# Patient Record
Sex: FEMALE | Race: ASIAN | NOT HISPANIC OR LATINO | URBAN - METROPOLITAN AREA
[De-identification: names, ages, dates, MRNs, and addresses within clinical notes are randomized per-mention and may not be internally consistent; named-entity substitution may affect disease eponyms.]

---

## 2018-01-22 ENCOUNTER — EMERGENCY (EMERGENCY)
Facility: HOSPITAL | Age: 53
LOS: 1 days | Discharge: HOME | End: 2018-01-22
Attending: EMERGENCY MEDICINE | Admitting: EMERGENCY MEDICINE
Payer: MEDICAID

## 2018-01-22 VITALS
SYSTOLIC BLOOD PRESSURE: 153 MMHG | TEMPERATURE: 99 F | RESPIRATION RATE: 20 BRPM | OXYGEN SATURATION: 100 % | HEART RATE: 85 BPM | DIASTOLIC BLOOD PRESSURE: 102 MMHG

## 2018-01-22 PROCEDURE — 99285 EMERGENCY DEPT VISIT HI MDM: CPT

## 2018-01-22 NOTE — ED ADULT TRIAGE NOTE - CHIEF COMPLAINT QUOTE
chest pain and back pain radiating to left arm and  tingling to left arm since Friday. also c/o MIGUEL.  pt has cardiac history.  Also c /o fever, cough, diarrhea  x5 today. vomited 4 times Yesterday. PMH of DM, HTN, high cholesterol, Hypothyroidism, asthma.

## 2018-01-23 VITALS
DIASTOLIC BLOOD PRESSURE: 86 MMHG | TEMPERATURE: 98 F | OXYGEN SATURATION: 99 % | HEART RATE: 89 BPM | SYSTOLIC BLOOD PRESSURE: 172 MMHG | RESPIRATION RATE: 18 BRPM

## 2018-01-23 DIAGNOSIS — Z90.49 ACQUIRED ABSENCE OF OTHER SPECIFIED PARTS OF DIGESTIVE TRACT: Chronic | ICD-10-CM

## 2018-01-23 LAB
ALBUMIN SERPL ELPH-MCNC: 4.2 G/DL — SIGNIFICANT CHANGE UP (ref 3.3–5)
ALP SERPL-CCNC: 70 U/L — SIGNIFICANT CHANGE UP (ref 40–120)
ALT FLD-CCNC: 29 U/L — SIGNIFICANT CHANGE UP (ref 4–33)
APPEARANCE UR: CLEAR — SIGNIFICANT CHANGE UP
AST SERPL-CCNC: 26 U/L — SIGNIFICANT CHANGE UP (ref 4–32)
BASOPHILS # BLD AUTO: 0.03 K/UL — SIGNIFICANT CHANGE UP (ref 0–0.2)
BASOPHILS NFR BLD AUTO: 0.6 % — SIGNIFICANT CHANGE UP (ref 0–2)
BILIRUB SERPL-MCNC: 0.2 MG/DL — SIGNIFICANT CHANGE UP (ref 0.2–1.2)
BILIRUB UR-MCNC: NEGATIVE — SIGNIFICANT CHANGE UP
BLOOD UR QL VISUAL: HIGH
BUN SERPL-MCNC: 15 MG/DL — SIGNIFICANT CHANGE UP (ref 7–23)
CALCIUM SERPL-MCNC: 8.8 MG/DL — SIGNIFICANT CHANGE UP (ref 8.4–10.5)
CHLORIDE SERPL-SCNC: 108 MMOL/L — HIGH (ref 98–107)
CK MB BLD-MCNC: 1 NG/ML — SIGNIFICANT CHANGE UP (ref 1–4.7)
CK MB BLD-MCNC: 1.04 NG/ML — SIGNIFICANT CHANGE UP (ref 1–4.7)
CK SERPL-CCNC: 141 U/L — SIGNIFICANT CHANGE UP (ref 25–170)
CK SERPL-CCNC: 164 U/L — SIGNIFICANT CHANGE UP (ref 25–170)
CO2 SERPL-SCNC: 24 MMOL/L — SIGNIFICANT CHANGE UP (ref 22–31)
COLOR SPEC: YELLOW — SIGNIFICANT CHANGE UP
CREAT SERPL-MCNC: 0.99 MG/DL — SIGNIFICANT CHANGE UP (ref 0.5–1.3)
EOSINOPHIL # BLD AUTO: 0.4 K/UL — SIGNIFICANT CHANGE UP (ref 0–0.5)
EOSINOPHIL NFR BLD AUTO: 8.3 % — HIGH (ref 0–6)
GLUCOSE SERPL-MCNC: 148 MG/DL — HIGH (ref 70–99)
GLUCOSE UR-MCNC: NEGATIVE — SIGNIFICANT CHANGE UP
HCT VFR BLD CALC: 35.1 % — SIGNIFICANT CHANGE UP (ref 34.5–45)
HGB BLD-MCNC: 11.1 G/DL — LOW (ref 11.5–15.5)
IMM GRANULOCYTES # BLD AUTO: 0.01 # — SIGNIFICANT CHANGE UP
IMM GRANULOCYTES NFR BLD AUTO: 0.2 % — SIGNIFICANT CHANGE UP (ref 0–1.5)
KETONES UR-MCNC: NEGATIVE — SIGNIFICANT CHANGE UP
LEUKOCYTE ESTERASE UR-ACNC: HIGH
LYMPHOCYTES # BLD AUTO: 1.47 K/UL — SIGNIFICANT CHANGE UP (ref 1–3.3)
LYMPHOCYTES # BLD AUTO: 30.4 % — SIGNIFICANT CHANGE UP (ref 13–44)
MCHC RBC-ENTMCNC: 27.8 PG — SIGNIFICANT CHANGE UP (ref 27–34)
MCHC RBC-ENTMCNC: 31.6 % — LOW (ref 32–36)
MCV RBC AUTO: 88 FL — SIGNIFICANT CHANGE UP (ref 80–100)
MONOCYTES # BLD AUTO: 0.68 K/UL — SIGNIFICANT CHANGE UP (ref 0–0.9)
MONOCYTES NFR BLD AUTO: 14 % — SIGNIFICANT CHANGE UP (ref 2–14)
MUCOUS THREADS # UR AUTO: SIGNIFICANT CHANGE UP
NEUTROPHILS # BLD AUTO: 2.25 K/UL — SIGNIFICANT CHANGE UP (ref 1.8–7.4)
NEUTROPHILS NFR BLD AUTO: 46.5 % — SIGNIFICANT CHANGE UP (ref 43–77)
NITRITE UR-MCNC: NEGATIVE — SIGNIFICANT CHANGE UP
NRBC # FLD: 0 — SIGNIFICANT CHANGE UP
PH UR: 5.5 — SIGNIFICANT CHANGE UP (ref 4.6–8)
PLATELET # BLD AUTO: 228 K/UL — SIGNIFICANT CHANGE UP (ref 150–400)
PMV BLD: 11.4 FL — SIGNIFICANT CHANGE UP (ref 7–13)
POTASSIUM SERPL-MCNC: 4.8 MMOL/L — SIGNIFICANT CHANGE UP (ref 3.5–5.3)
POTASSIUM SERPL-SCNC: 4.8 MMOL/L — SIGNIFICANT CHANGE UP (ref 3.5–5.3)
PROT SERPL-MCNC: 7.6 G/DL — SIGNIFICANT CHANGE UP (ref 6–8.3)
PROT UR-MCNC: 30 MG/DL — HIGH
RBC # BLD: 3.99 M/UL — SIGNIFICANT CHANGE UP (ref 3.8–5.2)
RBC # FLD: 14.1 % — SIGNIFICANT CHANGE UP (ref 10.3–14.5)
RBC CASTS # UR COMP ASSIST: HIGH (ref 0–?)
SODIUM SERPL-SCNC: 144 MMOL/L — SIGNIFICANT CHANGE UP (ref 135–145)
SP GR SPEC: 1.02 — SIGNIFICANT CHANGE UP (ref 1–1.04)
SQUAMOUS # UR AUTO: SIGNIFICANT CHANGE UP
TROPONIN T SERPL-MCNC: < 0.06 NG/ML — SIGNIFICANT CHANGE UP (ref 0–0.06)
TROPONIN T SERPL-MCNC: < 0.06 NG/ML — SIGNIFICANT CHANGE UP (ref 0–0.06)
UROBILINOGEN FLD QL: NORMAL MG/DL — SIGNIFICANT CHANGE UP
WBC # BLD: 4.84 K/UL — SIGNIFICANT CHANGE UP (ref 3.8–10.5)
WBC # FLD AUTO: 4.84 K/UL — SIGNIFICANT CHANGE UP (ref 3.8–10.5)
WBC UR QL: SIGNIFICANT CHANGE UP (ref 0–?)

## 2018-01-23 PROCEDURE — 71046 X-RAY EXAM CHEST 2 VIEWS: CPT | Mod: 26

## 2018-01-23 RX ORDER — CEPHALEXIN 500 MG
1 CAPSULE ORAL
Qty: 14 | Refills: 0
Start: 2018-01-23 | End: 2018-01-29

## 2018-01-23 RX ORDER — IBUPROFEN 200 MG
400 TABLET ORAL ONCE
Qty: 0 | Refills: 0 | Status: COMPLETED | OUTPATIENT
Start: 2018-01-23 | End: 2018-01-23

## 2018-01-23 RX ORDER — CEPHALEXIN 500 MG
500 CAPSULE ORAL ONCE
Qty: 0 | Refills: 0 | Status: COMPLETED | OUTPATIENT
Start: 2018-01-23 | End: 2018-01-23

## 2018-01-23 RX ORDER — SODIUM CHLORIDE 9 MG/ML
1000 INJECTION INTRAMUSCULAR; INTRAVENOUS; SUBCUTANEOUS ONCE
Qty: 0 | Refills: 0 | Status: COMPLETED | OUTPATIENT
Start: 2018-01-23 | End: 2018-01-23

## 2018-01-23 RX ORDER — ACETAMINOPHEN 500 MG
1000 TABLET ORAL ONCE
Qty: 0 | Refills: 0 | Status: COMPLETED | OUTPATIENT
Start: 2018-01-23 | End: 2018-01-23

## 2018-01-23 RX ORDER — FAMOTIDINE 10 MG/ML
20 INJECTION INTRAVENOUS ONCE
Qty: 0 | Refills: 0 | Status: COMPLETED | OUTPATIENT
Start: 2018-01-23 | End: 2018-01-23

## 2018-01-23 RX ADMIN — Medication 1000 MILLIGRAM(S): at 05:45

## 2018-01-23 RX ADMIN — FAMOTIDINE 20 MILLIGRAM(S): 10 INJECTION INTRAVENOUS at 01:24

## 2018-01-23 RX ADMIN — Medication 400 MILLIGRAM(S): at 01:24

## 2018-01-23 RX ADMIN — SODIUM CHLORIDE 1333.33 MILLILITER(S): 9 INJECTION INTRAMUSCULAR; INTRAVENOUS; SUBCUTANEOUS at 01:24

## 2018-01-23 RX ADMIN — Medication 30 MILLILITER(S): at 01:24

## 2018-01-23 NOTE — ED PROVIDER NOTE - MEDICAL DECISION MAKING DETAILS
Pt hx asthma, DM, HTN, thyroid dz now with flu like sx and chest pain for 3.5 days. Clinically appears dehydrated and mildly congested. Will evaluate with labs, imaging, f/u studies, reassess, dispo.

## 2018-01-23 NOTE — ED PROVIDER NOTE - CARE PLAN
Principal Discharge DX:	Viral syndrome  Secondary Diagnosis:	Chest pain Principal Discharge DX:	Urinary tract infection  Assessment and plan of treatment:	Take the keflex medication as prescribed twice daily for the next 7 days. Follow up with your regular doctor as needed for further evaluation. Return here to the emergency department for any new symptoms of concern such as severe shortness of breath, chest pain with difficulties breathing, or other new symptoms of acute worry.  Secondary Diagnosis:	Chest pain  Secondary Diagnosis:	Viral syndrome

## 2018-01-23 NOTE — ED PROVIDER NOTE - OBJECTIVE STATEMENT
Hx asthma, DM, HTN, thyroid dz, now with cough for the past week as well as chest pain for the past 3.5 days, describes it as sharp, starts in the middle of her back and radiates to the left arm and back. Today she developed nausea and had an episode of post-tussive emesis, she has also felt dehydrated and states she has not been drinking and eating as much as usual. She has had Hx asthma, DM, HTN, thyroid dz, now with cough for the past week as well as chest pain for the past 3.5 days, describes it as sharp, starts in the middle of her back and radiates to the left arm and back. Today she developed nausea and had an episode of post-tussive emesis, she has also felt dehydrated and states she has not been drinking and eating as much as usual. She has had heartburn in the past and states this feels similar but is abnormal in that she also feels fatigued. Hx asthma, DM, HTN, thyroid dz, now with cough for the past week as well as chest pain for the past 3.5 days, describes it as sharp, starts in the middle of her back and radiates to the left arm and back. Today she developed nausea and had an episode of post-tussive emesis, she has also felt dehydrated and states she has not been drinking and eating as much as usual. She has had heartburn in the past and states this feels similar but is abnormal in that she also feels fatigued. She denies having pain with urination, but states that it is occasionally stinging when initiating her urine. She denies other symptoms of concern at this time.

## 2018-01-23 NOTE — ED PROVIDER NOTE - PHYSICAL EXAMINATION
Gen: NAD, non-toxic, conversational  Eyes: PERRLA, EOMI   HENT: Normocephalic, atraumatic. External ears normal, no rhinorrhea, moist mucous membranes.   CV: RRR, no M/R/G  Resp: CTAB, non-labored, speaking without difficulty on room air  Abd: soft, non tender, non rigid, no guarding or rebound tenderness  Skin: dry, wwp   Neuro: AOx3, speech is fluent and appropriate  Psych: Mood good, affect euthymic Gen: NAD, non-toxic, conversational  Eyes: PERRL, EOMI   HENT: Normocephalic, atraumatic. External ears normal, no rhinorrhea, moist mucous membranes.   CV: RRR, no M/R/G  Resp: CTAB, non-labored, speaking without difficulty on room air  Abd: soft, non tender, non rigid, no guarding or rebound tenderness  Back: No midline tenderess, step off, or crepitation. No CVA tenderness.   Skin: dry, wwp   Neuro: AOx3, speech is fluent and appropriate  Psych: Mood good, affect euthymic

## 2018-01-23 NOTE — ED PROVIDER NOTE - PLAN OF CARE
Take the keflex medication as prescribed twice daily for the next 7 days. Follow up with your regular doctor as needed for further evaluation. Return here to the emergency department for any new symptoms of concern such as severe shortness of breath, chest pain with difficulties breathing, or other new symptoms of acute worry.

## 2018-01-23 NOTE — ED PROVIDER NOTE - ATTENDING CONTRIBUTION TO CARE
DR. DILL, ATTENDING MD-  I performed a face to face bedside interview with patient regarding history of present illness, review of symptoms and past medical history. I completed an independent physical exam.  I have discussed patient's plan of care with the resident.   Documentation as above in the note.    53 y/o female h/o dm, htn, high chol, hypothyroid, asthma here with c/o mmc.  CP left sided rad to lue x4 days, gradual onset, intermittent, feels similar to cp few years ago when she had neg angiogram.  Yesterday developed n/v x1 nbnb.  Today had subj fever, dry cough, loose stools.  No sick contacts or recent travel.  Has had flu vacc.  Denies ha, neck stiffness, sob, abd pain, dysuria, rash.  Afebrile, nad, ctabil, s1s2 rrr no m/r/g, cw mild ttp over sternum, no crepitus, abd soft non ttp no r/g, no cva tenderness b/l, no leg swelling b/l, no rash.  ACS vs viral syndrome vs pna vs anemia vs lyte abn.  Obtain cbc, bmp, cxr, ce's x2, ekg x2, give ivf, reassess.

## 2018-01-23 NOTE — ED PROVIDER NOTE - NS ED ROS FT
General: + fevers and chills  HENT: No ear pain, runny nose, + sore throat  Eyes: No visual changes  CP: + chest pain, no palpitations, no light headedness  Resp: No shortness of breath, + cough  GI: + epigastric abdominal pain, no diarrhea, no constipation, + nausea, + vomiting  : No urinary fz, dysuria, or hematuria  Neuro: No numbness, tingling, or weakness  Endo: +hx of diabetes  Heme: No hx of easy bleeding or bruising, +asa 81mg

## 2018-01-23 NOTE — ED ADULT NURSE NOTE - OBJECTIVE STATEMENT
Patient rec'd for midsternal CP x  5 days radiating to left arm worsening tonight. Patient endorses +SOB. C/o of pain in left arm with blood pressure cuff. Patient states she took her home dose of aspirin today. Placed on cardiac monitor NSR.

## 2023-06-13 PROBLEM — E11.9 TYPE 2 DIABETES MELLITUS WITHOUT COMPLICATIONS: Chronic | Status: ACTIVE | Noted: 2018-01-23

## 2023-06-13 PROBLEM — J45.909 UNSPECIFIED ASTHMA, UNCOMPLICATED: Chronic | Status: ACTIVE | Noted: 2018-01-23

## 2023-06-13 PROBLEM — I10 ESSENTIAL (PRIMARY) HYPERTENSION: Chronic | Status: ACTIVE | Noted: 2018-01-23

## 2023-06-13 PROBLEM — E07.9 DISORDER OF THYROID, UNSPECIFIED: Chronic | Status: ACTIVE | Noted: 2018-01-23

## 2023-06-16 ENCOUNTER — OUTPATIENT (OUTPATIENT)
Dept: OUTPATIENT SERVICES | Facility: HOSPITAL | Age: 58
LOS: 1 days | End: 2023-06-16
Payer: MEDICAID

## 2023-06-16 VITALS
WEIGHT: 153 LBS | OXYGEN SATURATION: 99 % | TEMPERATURE: 98 F | RESPIRATION RATE: 16 BRPM | HEART RATE: 79 BPM | DIASTOLIC BLOOD PRESSURE: 84 MMHG | HEIGHT: 61 IN | SYSTOLIC BLOOD PRESSURE: 140 MMHG

## 2023-06-16 DIAGNOSIS — E11.9 TYPE 2 DIABETES MELLITUS WITHOUT COMPLICATIONS: ICD-10-CM

## 2023-06-16 DIAGNOSIS — N60.11 DIFFUSE CYSTIC MASTOPATHY OF RIGHT BREAST: ICD-10-CM

## 2023-06-16 DIAGNOSIS — Z90.710 ACQUIRED ABSENCE OF BOTH CERVIX AND UTERUS: Chronic | ICD-10-CM

## 2023-06-16 DIAGNOSIS — N63.0 UNSPECIFIED LUMP IN UNSPECIFIED BREAST: ICD-10-CM

## 2023-06-16 DIAGNOSIS — Z98.890 OTHER SPECIFIED POSTPROCEDURAL STATES: Chronic | ICD-10-CM

## 2023-06-16 DIAGNOSIS — Z90.49 ACQUIRED ABSENCE OF OTHER SPECIFIED PARTS OF DIGESTIVE TRACT: Chronic | ICD-10-CM

## 2023-06-16 LAB
A1C WITH ESTIMATED AVERAGE GLUCOSE RESULT: 6.7 % — HIGH (ref 4–5.6)
ANION GAP SERPL CALC-SCNC: 16 MMOL/L — HIGH (ref 7–14)
BUN SERPL-MCNC: 24 MG/DL — HIGH (ref 7–23)
CALCIUM SERPL-MCNC: 9.7 MG/DL — SIGNIFICANT CHANGE UP (ref 8.4–10.5)
CHLORIDE SERPL-SCNC: 105 MMOL/L — SIGNIFICANT CHANGE UP (ref 98–107)
CO2 SERPL-SCNC: 20 MMOL/L — LOW (ref 22–31)
CREAT SERPL-MCNC: 1.22 MG/DL — SIGNIFICANT CHANGE UP (ref 0.5–1.3)
EGFR: 51 ML/MIN/1.73M2 — LOW
ESTIMATED AVERAGE GLUCOSE: 146 — SIGNIFICANT CHANGE UP
GLUCOSE SERPL-MCNC: 90 MG/DL — SIGNIFICANT CHANGE UP (ref 70–99)
HCT VFR BLD CALC: 37.8 % — SIGNIFICANT CHANGE UP (ref 34.5–45)
HGB BLD-MCNC: 11.2 G/DL — LOW (ref 11.5–15.5)
MCHC RBC-ENTMCNC: 25.6 PG — LOW (ref 27–34)
MCHC RBC-ENTMCNC: 29.6 GM/DL — LOW (ref 32–36)
MCV RBC AUTO: 86.3 FL — SIGNIFICANT CHANGE UP (ref 80–100)
NRBC # BLD: 0 /100 WBCS — SIGNIFICANT CHANGE UP (ref 0–0)
NRBC # FLD: 0 K/UL — SIGNIFICANT CHANGE UP (ref 0–0)
PLATELET # BLD AUTO: 419 K/UL — HIGH (ref 150–400)
POTASSIUM SERPL-MCNC: 4 MMOL/L — SIGNIFICANT CHANGE UP (ref 3.5–5.3)
POTASSIUM SERPL-SCNC: 4 MMOL/L — SIGNIFICANT CHANGE UP (ref 3.5–5.3)
RBC # BLD: 4.38 M/UL — SIGNIFICANT CHANGE UP (ref 3.8–5.2)
RBC # FLD: 15.7 % — HIGH (ref 10.3–14.5)
SODIUM SERPL-SCNC: 141 MMOL/L — SIGNIFICANT CHANGE UP (ref 135–145)
WBC # BLD: 9.85 K/UL — SIGNIFICANT CHANGE UP (ref 3.8–10.5)
WBC # FLD AUTO: 9.85 K/UL — SIGNIFICANT CHANGE UP (ref 3.8–10.5)

## 2023-06-16 PROCEDURE — 93010 ELECTROCARDIOGRAM REPORT: CPT

## 2023-06-16 NOTE — H&P PST ADULT - HISTORY OF PRESENT ILLNESS
57 yrs old female with h/o mammo and a ? mass was noted in her right breast. Pt then had further diagnostic studies including a biopsy that showed malignancy as per the pt. Pt presents for preop eval to have lumpectomy right breast with bria  localization, sentinel node injection, biopsy with frozen section possible axillary lymph node dissection on 6/22/23. 57 yrs old female with h/o mammo recently and a ? mass was noted in her right breast. Pt then had further diagnostic studies including a biopsy that showed malignancy as per the pt. Pt presents for preop eval to have lumpectomy right breast with bria  localization, sentinel node injection, biopsy with frozen section possible axillary lymph node dissection on 6/22/23.

## 2023-06-16 NOTE — H&P PST ADULT - ASSESSMENT
57 yrs old female with ? mass in her right breast presents for preop eval to have lumpectomy right breast with bria  localization, sentinel node injection, biopsy with frozen section possible axillary lymph node dissection on 6/22/23.

## 2023-06-16 NOTE — H&P PST ADULT - NSANTHOSAYNRD_GEN_A_CORE
never tested/No. ASHLEY screening performed.  STOP BANG Legend: 0-2 = LOW Risk; 3-4 = INTERMEDIATE Risk; 5-8 = HIGH Risk

## 2023-06-16 NOTE — H&P PST ADULT - NSICDXPASTSURGICALHX_GEN_ALL_CORE_FT
PAST SURGICAL HISTORY:  H/O ovarian cystectomy     H/O: hysterectomy     History of cholecystectomy

## 2023-06-16 NOTE — H&P PST ADULT - NSICDXPASTMEDICALHX_GEN_ALL_CORE_FT
PAST MEDICAL HISTORY:  Asthma     Breast mass     DM (diabetes mellitus)     Gall stones     HTN (hypertension)     Thyroid disease

## 2023-06-16 NOTE — H&P PST ADULT - PROBLEM SELECTOR PLAN 2
a1c pending, will order FS for the DOS.   Pt also advised not to take any diabetic meds on the DOS.   Pt also advised to stop Farxiga on 6/18/23.

## 2023-06-16 NOTE — H&P PST ADULT - PROBLEM SELECTOR PLAN 1
57 yrs old female with ? mass in her right breast presents for preop eval to have lumpectomy right breast with bria  localization, sentinel node injection, biopsy with frozen section possible axillary lymph node dissection on 6/22/23.  labs pending, ekg in chart.  Preop instructions provided to pt.  Famotidine and chlorhexidine scrubs provided to pt with instructions.  Pt advised to stop baby aspirin on 6/16/23.  Pt going for medical clearance - will obtain copy

## 2023-06-19 PROBLEM — K80.20 CALCULUS OF GALLBLADDER WITHOUT CHOLECYSTITIS WITHOUT OBSTRUCTION: Chronic | Status: ACTIVE | Noted: 2023-06-16

## 2023-06-19 PROBLEM — N63.0 UNSPECIFIED LUMP IN UNSPECIFIED BREAST: Chronic | Status: ACTIVE | Noted: 2023-06-16

## 2023-06-20 ENCOUNTER — OUTPATIENT (OUTPATIENT)
Dept: OUTPATIENT SERVICES | Facility: HOSPITAL | Age: 58
LOS: 1 days | End: 2023-06-20
Payer: MEDICAID

## 2023-06-20 ENCOUNTER — APPOINTMENT (OUTPATIENT)
Dept: MAMMOGRAPHY | Facility: IMAGING CENTER | Age: 58
End: 2023-06-20
Payer: MEDICAID

## 2023-06-20 DIAGNOSIS — Z90.49 ACQUIRED ABSENCE OF OTHER SPECIFIED PARTS OF DIGESTIVE TRACT: Chronic | ICD-10-CM

## 2023-06-20 DIAGNOSIS — Z98.890 OTHER SPECIFIED POSTPROCEDURAL STATES: Chronic | ICD-10-CM

## 2023-06-20 DIAGNOSIS — Z90.710 ACQUIRED ABSENCE OF BOTH CERVIX AND UTERUS: Chronic | ICD-10-CM

## 2023-06-20 DIAGNOSIS — Z00.8 ENCOUNTER FOR OTHER GENERAL EXAMINATION: ICD-10-CM

## 2023-06-20 PROCEDURE — C1739: CPT

## 2023-06-20 PROCEDURE — 19281 PERQ DEVICE BREAST 1ST IMAG: CPT | Mod: RT

## 2023-06-20 PROCEDURE — 19282 PERQ DEVICE BREAST EA IMAG: CPT | Mod: RT

## 2023-06-20 PROCEDURE — 19282 PERQ DEVICE BREAST EA IMAG: CPT

## 2023-06-20 PROCEDURE — 19281 PERQ DEVICE BREAST 1ST IMAG: CPT

## 2023-06-21 ENCOUNTER — TRANSCRIPTION ENCOUNTER (OUTPATIENT)
Age: 58
End: 2023-06-21

## 2023-06-21 PROBLEM — Z00.00 ENCOUNTER FOR PREVENTIVE HEALTH EXAMINATION: Status: ACTIVE | Noted: 2023-06-21

## 2023-06-22 ENCOUNTER — TRANSCRIPTION ENCOUNTER (OUTPATIENT)
Age: 58
End: 2023-06-22

## 2023-06-22 ENCOUNTER — OUTPATIENT (OUTPATIENT)
Dept: OUTPATIENT SERVICES | Facility: HOSPITAL | Age: 58
LOS: 1 days | End: 2023-06-22
Payer: COMMERCIAL

## 2023-06-22 ENCOUNTER — OUTPATIENT (OUTPATIENT)
Dept: OUTPATIENT SERVICES | Facility: HOSPITAL | Age: 58
LOS: 1 days | Discharge: HOME | End: 2023-06-22
Payer: MEDICAID

## 2023-06-22 ENCOUNTER — APPOINTMENT (OUTPATIENT)
Dept: NUCLEAR MEDICINE | Facility: IMAGING CENTER | Age: 58
End: 2023-06-22

## 2023-06-22 ENCOUNTER — APPOINTMENT (OUTPATIENT)
Dept: MAMMOGRAPHY | Facility: IMAGING CENTER | Age: 58
End: 2023-06-22
Payer: COMMERCIAL

## 2023-06-22 VITALS
OXYGEN SATURATION: 97 % | SYSTOLIC BLOOD PRESSURE: 137 MMHG | DIASTOLIC BLOOD PRESSURE: 62 MMHG | HEART RATE: 80 BPM | TEMPERATURE: 97 F | RESPIRATION RATE: 14 BRPM

## 2023-06-22 VITALS
HEART RATE: 73 BPM | HEIGHT: 61 IN | TEMPERATURE: 98 F | OXYGEN SATURATION: 100 % | DIASTOLIC BLOOD PRESSURE: 83 MMHG | SYSTOLIC BLOOD PRESSURE: 163 MMHG | RESPIRATION RATE: 16 BRPM | WEIGHT: 153 LBS

## 2023-06-22 DIAGNOSIS — N60.11 DIFFUSE CYSTIC MASTOPATHY OF RIGHT BREAST: ICD-10-CM

## 2023-06-22 DIAGNOSIS — Z00.8 ENCOUNTER FOR OTHER GENERAL EXAMINATION: ICD-10-CM

## 2023-06-22 DIAGNOSIS — Z90.49 ACQUIRED ABSENCE OF OTHER SPECIFIED PARTS OF DIGESTIVE TRACT: Chronic | ICD-10-CM

## 2023-06-22 DIAGNOSIS — Z98.890 OTHER SPECIFIED POSTPROCEDURAL STATES: Chronic | ICD-10-CM

## 2023-06-22 DIAGNOSIS — Z90.710 ACQUIRED ABSENCE OF BOTH CERVIX AND UTERUS: Chronic | ICD-10-CM

## 2023-06-22 LAB — GLUCOSE BLDC GLUCOMTR-MCNC: 119 MG/DL — HIGH (ref 70–99)

## 2023-06-22 PROCEDURE — 76098 X-RAY EXAM SURGICAL SPECIMEN: CPT

## 2023-06-22 PROCEDURE — 88307 TISSUE EXAM BY PATHOLOGIST: CPT | Mod: 26

## 2023-06-22 PROCEDURE — 76098 X-RAY EXAM SURGICAL SPECIMEN: CPT | Mod: 26

## 2023-06-22 PROCEDURE — 88331 PATH CONSLTJ SURG 1 BLK 1SPC: CPT | Mod: 26

## 2023-06-22 DEVICE — CLIP APPLIER COVIDIEN SURGICLIP 13" LARGE: Type: IMPLANTABLE DEVICE | Status: FUNCTIONAL

## 2023-06-22 RX ORDER — OXYCODONE HYDROCHLORIDE 5 MG/1
1 TABLET ORAL
Qty: 10 | Refills: 0
Start: 2023-06-22 | End: 2023-06-24

## 2023-06-22 RX ORDER — AMLODIPINE BESYLATE 2.5 MG/1
1 TABLET ORAL
Refills: 0 | DISCHARGE

## 2023-06-22 RX ORDER — ALOGLIPTIN 12.5 MG/1
1 TABLET, FILM COATED ORAL
Refills: 0 | DISCHARGE

## 2023-06-22 RX ORDER — ATORVASTATIN CALCIUM 80 MG/1
1 TABLET, FILM COATED ORAL
Refills: 0 | DISCHARGE

## 2023-06-22 RX ORDER — RAMIPRIL 5 MG
1 CAPSULE ORAL
Refills: 0 | DISCHARGE

## 2023-06-22 RX ORDER — ASPIRIN/CALCIUM CARB/MAGNESIUM 324 MG
1 TABLET ORAL
Refills: 0 | DISCHARGE

## 2023-06-22 RX ORDER — LEVOTHYROXINE SODIUM 125 MCG
1 TABLET ORAL
Refills: 0 | DISCHARGE

## 2023-06-22 RX ORDER — SODIUM CHLORIDE 9 MG/ML
1000 INJECTION, SOLUTION INTRAVENOUS
Refills: 0 | Status: DISCONTINUED | OUTPATIENT
Start: 2023-06-22 | End: 2023-07-06

## 2023-06-22 RX ORDER — DAPAGLIFLOZIN 10 MG/1
1 TABLET, FILM COATED ORAL
Refills: 0 | DISCHARGE

## 2023-06-22 RX ORDER — ALBUTEROL 90 UG/1
2 AEROSOL, METERED ORAL
Refills: 0 | DISCHARGE

## 2023-06-22 RX ORDER — METFORMIN HYDROCHLORIDE 850 MG/1
1 TABLET ORAL
Refills: 0 | DISCHARGE

## 2023-06-22 NOTE — ASU PREOPERATIVE ASSESSMENT, ADULT ([facility] ONLY) - FALL HARM RISK - UNIVERSAL INTERVENTIONS
Bed in lowest position, wheels locked, appropriate side rails in place/Call bell, personal items and telephone in reach/Instruct patient to call for assistance before getting out of bed or chair/Non-slip footwear when patient is out of bed/Caret to call system/Physically safe environment - no spills, clutter or unnecessary equipment/Purposeful Proactive Rounding/Room/bathroom lighting operational, light cord in reach

## 2023-06-22 NOTE — BRIEF OPERATIVE NOTE - OPERATION/FINDINGS
Right breast lumpectomy with saviscout localization and sentinel lymph node biopsy with preoperative radioactive tracer injection. LIA drain was left in cavity.

## 2023-06-22 NOTE — ASU DISCHARGE PLAN (ADULT/PEDIATRIC) - CARE PROVIDER_API CALL
Angelina De Souza  Surgery  [address] [place] [address]  Phone: [phone]  Fax: [phone]  Scheduled Appointment: 06/27/2023

## 2023-06-22 NOTE — BRIEF OPERATIVE NOTE - NSICDXBRIEFPREOP_GEN_ALL_CORE_FT
PRE-OP DIAGNOSIS:  Diffuse cystic mastopathy of right breast 22-Jun-2023 17:13:05  Christian Ayala Sub

## 2023-06-22 NOTE — ASU DISCHARGE PLAN (ADULT/PEDIATRIC) - ASU DC SPECIAL INSTRUCTIONSFT
Breast Biopsy/Lumpectomy Post-operative Instructions  1.	Supportive bra- Please wear the supportive bra continuously for 48 hours after the procedure, including wearing it to sleep.  Therefore, you may wear your regular bra.  You may remove the bra to shower.  The sports bra will provide support, decrease the amount of swelling at the biopsy site, and make your recovery more comfortable.    2.	Wound dressing- There is a dressing on the wound. Please do not remove the dressing. The dressing will be removed at your followup appointment.    3.	Showering/Bathing- You may shower over the dressing the 24 hours after surgery after surgery.  Allow the water to run over the dressing, but do not scrub the area.  It is best not to sit in a bathtub or swimming pool for at least one week after surgery.    4.	Activity level- You may resume most normal daily activity as tolerated, but avoid strenuous activities such as aerobics, jogging, exercising or heavy lifting for at least 1 week after surgery.  You may return to work in 1-2 days after surgery.  You may drive as long as you are not taking any prescription pain medication.    5.	Pain Medication- You may take the prescribed medication, or you may take extra-strength Tylenol as needed.  Please don't take aspirin, Motrin, Advil or any other anti-inflammatory medications, as these medications may cause bleeding or bruising.   You may take oxycodone 2.5mg (1/2 tab) as needed every 4 hours for moderate breakthrough pain or oxycodone 5mg (1 tab) every 4 hours as needed for severe breakthrough pain     6.	Follow-up Appointment- Please call the office to schedule your post-operative appointment for next Tuesday 6/27/23.    7.	Bruising/Bleeding/Swelling- It is normal for there to be some bruising and swelling at the breast biopsy site, and there may be some staining of blood on to the dressing.  Some discomfort at the surgical site is expected.  If your symptoms seem excessive, or if you have any question or concerns, please call the office.    8.           Drain- You will be leaving with a drain. Please record the output and color of the contents of the drain and bring the records to your followup appointment.

## 2023-06-22 NOTE — BRIEF OPERATIVE NOTE - NSICDXBRIEFPROCEDURE_GEN_ALL_CORE_FT
PROCEDURES:  Lumpectomy of right breast with sentinel node biopsy 22-Jun-2023 17:12:53  Christian Ayala Sub

## 2023-06-22 NOTE — BRIEF OPERATIVE NOTE - NSICDXBRIEFPOSTOP_GEN_ALL_CORE_FT
POST-OP DIAGNOSIS:  Diffuse cystic mastopathy of right breast 22-Jun-2023 17:13:08  Christian Ayala Sub

## 2023-06-22 NOTE — ASU DISCHARGE PLAN (ADULT/PEDIATRIC) - CALL YOUR DOCTOR IF YOU HAVE ANY OF THE FOLLOWING:
Bleeding that does not stop/Swelling that gets worse/Pain not relieved by Medications/Fever greater than (need to indicate Fahrenheit or Celsius)/Wound/Surgical Site with redness, or foul smelling discharge or pus/Nausea and vomiting that does not stop
16

## 2023-06-28 LAB — SURGICAL PATHOLOGY STUDY: SIGNIFICANT CHANGE UP

## 2023-07-21 ENCOUNTER — OUTPATIENT (OUTPATIENT)
Dept: OUTPATIENT SERVICES | Facility: HOSPITAL | Age: 58
LOS: 1 days | Discharge: HOME | End: 2023-07-21
Payer: MEDICAID

## 2023-07-21 DIAGNOSIS — Z90.49 ACQUIRED ABSENCE OF OTHER SPECIFIED PARTS OF DIGESTIVE TRACT: Chronic | ICD-10-CM

## 2023-07-21 DIAGNOSIS — Z98.890 OTHER SPECIFIED POSTPROCEDURAL STATES: Chronic | ICD-10-CM

## 2023-07-21 DIAGNOSIS — Z90.710 ACQUIRED ABSENCE OF BOTH CERVIX AND UTERUS: Chronic | ICD-10-CM

## 2023-07-26 ENCOUNTER — APPOINTMENT (OUTPATIENT)
Dept: RADIATION ONCOLOGY | Facility: CLINIC | Age: 58
End: 2023-07-26
Payer: MEDICAID

## 2023-07-26 VITALS
HEART RATE: 75 BPM | SYSTOLIC BLOOD PRESSURE: 135 MMHG | BODY MASS INDEX: 28.91 KG/M2 | TEMPERATURE: 97.88 F | HEIGHT: 63 IN | DIASTOLIC BLOOD PRESSURE: 86 MMHG | OXYGEN SATURATION: 100 % | WEIGHT: 163.14 LBS

## 2023-07-26 DIAGNOSIS — Z86.39 PERSONAL HISTORY OF OTHER ENDOCRINE, NUTRITIONAL AND METABOLIC DISEASE: ICD-10-CM

## 2023-07-26 DIAGNOSIS — Z80.49 FAMILY HISTORY OF MALIGNANT NEOPLASM OF OTHER GENITAL ORGANS: ICD-10-CM

## 2023-07-26 DIAGNOSIS — Z87.09 PERSONAL HISTORY OF OTHER DISEASES OF THE RESPIRATORY SYSTEM: ICD-10-CM

## 2023-07-26 DIAGNOSIS — I10 ESSENTIAL (PRIMARY) HYPERTENSION: ICD-10-CM

## 2023-07-26 DIAGNOSIS — Z80.1 FAMILY HISTORY OF MALIGNANT NEOPLASM OF TRACHEA, BRONCHUS AND LUNG: ICD-10-CM

## 2023-07-26 DIAGNOSIS — Z86.79 PERSONAL HISTORY OF OTHER DISEASES OF THE CIRCULATORY SYSTEM: ICD-10-CM

## 2023-07-26 PROCEDURE — 99204 OFFICE O/P NEW MOD 45 MIN: CPT | Mod: 25

## 2023-07-26 RX ORDER — LEVOTHYROXINE SODIUM 88 UG/1
88 CAPSULE ORAL
Refills: 0 | Status: ACTIVE | COMMUNITY
Start: 2023-07-26

## 2023-07-26 RX ORDER — ATORVASTATIN CALCIUM 40 MG/1
40 TABLET, FILM COATED ORAL
Refills: 0 | Status: ACTIVE | COMMUNITY
Start: 2023-07-26

## 2023-07-26 RX ORDER — DAPAGLIFLOZIN 10 MG/1
10 TABLET, FILM COATED ORAL
Refills: 0 | Status: ACTIVE | COMMUNITY
Start: 2023-07-26

## 2023-07-26 RX ORDER — AMLODIPINE BESYLATE 5 MG/1
5 TABLET ORAL
Refills: 0 | Status: ACTIVE | COMMUNITY
Start: 2023-07-26

## 2023-07-26 RX ORDER — TOPIRAMATE 50 MG/1
50 CAPSULE, EXTENDED RELEASE ORAL
Refills: 0 | Status: ACTIVE | COMMUNITY
Start: 2023-07-26

## 2023-07-26 RX ORDER — ANASTROZOLE TABLETS 1 MG/1
1 TABLET ORAL
Refills: 0 | Status: ACTIVE | COMMUNITY
Start: 2023-07-26

## 2023-07-26 RX ORDER — FENOFIBRATE 134 MG/1
134 CAPSULE ORAL
Refills: 0 | Status: ACTIVE | COMMUNITY
Start: 2023-07-26

## 2023-07-26 RX ORDER — SITAGLIPTIN 100 MG/1
100 TABLET, FILM COATED ORAL
Refills: 0 | Status: ACTIVE | COMMUNITY
Start: 2023-07-26

## 2023-07-26 RX ORDER — OMEPRAZOLE 20 MG/1
20 CAPSULE, DELAYED RELEASE ORAL
Refills: 0 | Status: ACTIVE | COMMUNITY
Start: 2023-07-26

## 2023-07-26 RX ORDER — RAMIPRIL 5 MG/1
5 CAPSULE ORAL
Refills: 0 | Status: ACTIVE | COMMUNITY
Start: 2023-07-26

## 2023-07-27 PROCEDURE — 77263 THER RADIOLOGY TX PLNG CPLX: CPT

## 2023-08-01 ENCOUNTER — NON-APPOINTMENT (OUTPATIENT)
Age: 58
End: 2023-08-01

## 2023-08-01 PROCEDURE — 77290 THER RAD SIMULAJ FIELD CPLX: CPT | Mod: 26

## 2023-08-01 PROCEDURE — 77334 RADIATION TREATMENT AID(S): CPT | Mod: 26

## 2023-08-02 NOTE — DISEASE MANAGEMENT
[Pathological] : TNM Stage: p [0] : 0 [FreeTextEntry4] : Ductal carcinoma of the breast [TTNM] : is [NTNM] : 0 [MTNM] : x

## 2023-08-02 NOTE — HISTORY OF PRESENT ILLNESS
[FreeTextEntry1] :  MS. Piper is a57 year old post menopausal woman seen today for discussion of treatment for DCIS.  Her HPI as I understand it is summarized below: She reports going for routine mammo in April. this showed a cluster of calcifications at 12 oclock. She underwent core bx confirming DCIS. She saw Dr. De Souza for evaluation. She was sent for an MRI which confirmed the bx site and clip 3 cm from the bx site. No other lesions seen. she then underwent lumpectomy of the right breast with sentinel node sampling on 6/22/23.  Dx Ductal carcinoma insitu  8mm, ER+ IA+, solid, cribiform and micropapillary with intermediate nuclear grade. I sentinel lymph node negative.   Pt referred to Dr Lynch medical oncologist and Dr Herbert for evaluation Pt saw Dr Lynch who has prescribed anastrozole to be started at the time of completion of radiation.  Ms. Piper presents today AOX3.  Denies pain in right breast.  She is able to lift both arms above her head.  She has no first degree relatives with breast cancer but does have three maternal cousins with breast cancer and one paternal cousin with breast cancer.  Another maternal cousin had uterine cancer and another maternal cousin lung cancer. Patient does not recall if she was sent for genetic testing

## 2023-08-02 NOTE — PHYSICAL EXAM
[General Appearance - In No Acute Distress] : in no acute distress [] : no respiratory distress [Abdomen Soft] : soft [Supraclavicular Lymph Nodes Enlarged Bilaterally] : supraclavicular [Axillary Lymph Nodes Enlarged Bilaterally] : axillary [de-identified] : recent surgical incision upper portion ofright breast nipple inverted

## 2023-08-02 NOTE — VITALS
[Maximal Pain Intensity: 0/10] : 0/10 [Least Pain Intensity: 0/10] : 0/10 [90: Able to carry normal activity; minor signs or symptoms of disease.] : 90: Able to carry normal activity; minor signs or symptoms of disease.  [ECOG Performance Status: 1 - Restricted in physically strenuous activity but ambulatory and able to carry out work of a light or sedentary nature] : Performance Status: 1 - Restricted in physically strenuous activity but ambulatory and able to carry out work of a light or sedentary nature, e.g., light house work, office work [Date: ____________] : Patient's last distress assessment performed on [unfilled]. [4 - Distress Level] : Distress Level: 4 [Referred Patient  to social work for follow-up] : Patient was referred to social work for follow-up [FreeTextEntry7] : pt needs help with transporation

## 2023-08-10 PROCEDURE — 77295 3-D RADIOTHERAPY PLAN: CPT | Mod: 26

## 2023-08-10 PROCEDURE — 77300 RADIATION THERAPY DOSE PLAN: CPT | Mod: 26

## 2023-08-10 PROCEDURE — 77334 RADIATION TREATMENT AID(S): CPT | Mod: 26

## 2023-08-16 ENCOUNTER — NON-APPOINTMENT (OUTPATIENT)
Age: 58
End: 2023-08-16

## 2023-08-21 PROCEDURE — 77280 THER RAD SIMULAJ FIELD SMPL: CPT | Mod: 26

## 2023-08-22 ENCOUNTER — NON-APPOINTMENT (OUTPATIENT)
Age: 58
End: 2023-08-22

## 2023-08-22 NOTE — HISTORY OF PRESENT ILLNESS
[FreeTextEntry1] : Ms. Piper is a 57 yr old post menopausal woman s/p breast conserving surgery with SLN sampling for DCIS.  She is currently receiving breast RT  8/22/23: Tolerating treatment. Reports mild fatigue. Skin care discussed and verbalized understanding

## 2023-08-22 NOTE — DISEASE MANAGEMENT
[Pathological] : TNM Stage: p [TTNM] : is [NTNM] : 0 [MTNM] : x [0] : 0 [de-identified] : 985 [de-identified] : 0272 [de-identified] : right breast

## 2023-08-22 NOTE — PHYSICAL EXAM
[General Appearance - Well Developed] : well developed [] : no respiratory distress [Oriented To Time, Place, And Person] : oriented to person, place, and time [de-identified] : surgical incision upper portion of right breast- right nipple inverted.

## 2023-08-24 ENCOUNTER — NON-APPOINTMENT (OUTPATIENT)
Age: 58
End: 2023-08-24

## 2023-08-28 PROCEDURE — 77427 RADIATION TX MANAGEMENT X5: CPT

## 2023-08-29 ENCOUNTER — NON-APPOINTMENT (OUTPATIENT)
Age: 58
End: 2023-08-29

## 2023-08-29 NOTE — HISTORY OF PRESENT ILLNESS
[FreeTextEntry1] : Ms. Piper is a 57 yr old post-menopausal woman s/p breast conserving surgery with SLN sampling for DCIS.  She is currently receiving breast RT.  8/22/23: Tolerating treatment. Reports mild fatigue. Skin care discussed and verbalized understanding.   8/29/23: Tolerating treatment. Reports fatigue. She reports having insomnia which may be attributing to her fatigue. Reports HA, took Tylenol which didn't help. + dry cough that started 8/25. Reports nausea. No vomiting. She has questions regarding diet, will refer to RD. No skin complaints.

## 2023-08-29 NOTE — DISEASE MANAGEMENT
[Pathological] : TNM Stage: p [0] : 0 [TTNM] : is [NTNM] : 0 [MTNM] : x [de-identified] : 1380 [de-identified] : 5393 [de-identified] : right breast

## 2023-08-31 ENCOUNTER — NON-APPOINTMENT (OUTPATIENT)
Age: 58
End: 2023-08-31

## 2023-09-05 ENCOUNTER — NON-APPOINTMENT (OUTPATIENT)
Age: 58
End: 2023-09-05

## 2023-09-05 PROCEDURE — 77427 RADIATION TX MANAGEMENT X5: CPT

## 2023-09-05 NOTE — HISTORY OF PRESENT ILLNESS
[FreeTextEntry1] : Ms. Piper is a 57 yr old post-menopausal woman s/p breast conserving surgery with SLN sampling for DCIS.  She is currently receiving breast RT.  8/22/23: Tolerating treatment. Reports mild fatigue. Skin care discussed and verbalized understanding.   8/29/23: Tolerating treatment. Reports fatigue. She reports having insomnia which may be attributing to her fatigue. Reports HA, took Tylenol which didn't help. + dry cough that started 8/25. Reports nausea. No vomiting. She has questions regarding diet, will refer to RD. No skin complaints.  9/5/23: Tolerating treatment. Continues with increasing fatigue. Denies any pain currently. Can have intermittent incisional pain. Nausea has improved, she is taking sage supplements. Denies any vomiting. Had episode of diarrhea , took Pepto. Faint hyperpigmentation noted, continues on Elva and Aquaphor.

## 2023-09-05 NOTE — VITALS
[Maximal Pain Intensity: 0/10] : 0/10 [Least Pain Intensity: 0/10] : 0/10 [80: Normal activity with effort; some signs or symptoms of disease.] : 80: Normal activity with effort; some signs or symptoms of disease.  [ECOG Performance Status: 1 - Restricted in physically strenuous activity but ambulatory and able to carry out work of a light or sedentary nature] : Performance Status: 1 - Restricted in physically strenuous activity but ambulatory and able to carry out work of a light or sedentary nature, e.g., light house work, office work Breath Sounds equal & clear to percussion & auscultation, no accessory muscle use

## 2023-09-05 NOTE — DISEASE MANAGEMENT
[Pathological] : TNM Stage: p [0] : 0 [TTNM] : is [NTNM] : 0 [MTNM] : x [de-identified] : 2250 [de-identified] : 8859 [de-identified] : right breast

## 2023-09-06 ENCOUNTER — NON-APPOINTMENT (OUTPATIENT)
Age: 58
End: 2023-09-06

## 2023-09-12 ENCOUNTER — NON-APPOINTMENT (OUTPATIENT)
Age: 58
End: 2023-09-12

## 2023-09-12 PROCEDURE — 77427 RADIATION TX MANAGEMENT X5: CPT

## 2023-09-13 ENCOUNTER — NON-APPOINTMENT (OUTPATIENT)
Age: 58
End: 2023-09-13

## 2023-09-19 ENCOUNTER — NON-APPOINTMENT (OUTPATIENT)
Age: 58
End: 2023-09-19

## 2023-09-21 ENCOUNTER — NON-APPOINTMENT (OUTPATIENT)
Age: 58
End: 2023-09-21

## 2023-09-21 ENCOUNTER — APPOINTMENT (OUTPATIENT)
Dept: PHYSICAL MEDICINE AND REHAB | Facility: CLINIC | Age: 58
End: 2023-09-21
Payer: MEDICAID

## 2023-09-21 VITALS — SYSTOLIC BLOOD PRESSURE: 143 MMHG | DIASTOLIC BLOOD PRESSURE: 77 MMHG | OXYGEN SATURATION: 99 % | HEART RATE: 82 BPM

## 2023-09-21 DIAGNOSIS — Z91.89 OTHER SPECIFIED PERSONAL RISK FACTORS, NOT ELSEWHERE CLASSIFIED: ICD-10-CM

## 2023-09-21 PROCEDURE — 99204 OFFICE O/P NEW MOD 45 MIN: CPT

## 2023-09-25 NOTE — H&P PST ADULT - SOURCE OF INFORMATION, PROFILE
[facility] Call Center    Phone Message    May a detailed message be left on voicemail: yes     Reason for Call: Other: Per pt would like if they team orders a 90 day supply since it would be easier and cost less in the long run plus refills for medication abacavir-dolutegravir-LamiVUDine (TRIUMEQ) 600- MG per tablet. Per pt it cost about $4k a month if insurance doesn't cover it. Please call pt back if any questions.      Action Taken: Message routed to:  [facility] ([facility]): ID    Travel Screening: Not Applicable                                                                     patient

## 2023-09-26 PROBLEM — Z91.89 AT RISK FOR LYMPHEDEMA: Status: ACTIVE | Noted: 2023-09-21

## 2023-09-27 ENCOUNTER — NON-APPOINTMENT (OUTPATIENT)
Age: 58
End: 2023-09-27

## 2023-10-03 ENCOUNTER — NON-APPOINTMENT (OUTPATIENT)
Age: 58
End: 2023-10-03

## 2023-10-12 ENCOUNTER — NON-APPOINTMENT (OUTPATIENT)
Age: 58
End: 2023-10-12

## 2023-10-17 ENCOUNTER — APPOINTMENT (OUTPATIENT)
Dept: RADIATION ONCOLOGY | Facility: CLINIC | Age: 58
End: 2023-10-17
Payer: MEDICAID

## 2023-10-17 VITALS
WEIGHT: 167.04 LBS | OXYGEN SATURATION: 100 % | RESPIRATION RATE: 18 BRPM | HEIGHT: 63 IN | BODY MASS INDEX: 29.6 KG/M2 | SYSTOLIC BLOOD PRESSURE: 135 MMHG | TEMPERATURE: 96.1 F | DIASTOLIC BLOOD PRESSURE: 82 MMHG | HEART RATE: 84 BPM

## 2023-10-17 PROCEDURE — 99024 POSTOP FOLLOW-UP VISIT: CPT

## 2023-10-23 ENCOUNTER — APPOINTMENT (OUTPATIENT)
Dept: PHYSICAL MEDICINE AND REHAB | Facility: CLINIC | Age: 58
End: 2023-10-23
Payer: MEDICAID

## 2023-10-23 PROCEDURE — 99213 OFFICE O/P EST LOW 20 MIN: CPT

## 2023-10-25 ENCOUNTER — NON-APPOINTMENT (OUTPATIENT)
Age: 58
End: 2023-10-25

## 2023-10-30 ENCOUNTER — NON-APPOINTMENT (OUTPATIENT)
Age: 58
End: 2023-10-30

## 2023-10-31 NOTE — H&P PST ADULT - ADMIT DATE
Patient ID: Suze Najera is a 65 y.o. female.    Cystoscopy    Date/Time: 10/31/2023 1:50 PM    Performed by: Elise Gaming MD  Authorized by: Elise Gaming MD    Procedure - Bladder Cystoscopy:     Procedure details: cystoscopy      Follow up visit for:   CYSTOSCOPY    This is a 65 y.o. y.o. patient who presents for cystoscopy.   Indication(s): ***    Last visit   2/24/23: 1. OAB  - Discussed etiology of OAB and potential management options.  - Reviewed bladder health recommendations (fluid intake, avoiding bladder triggers).  - Discussed treatment options: medications, PTNS, intradetrusor Botox injections, or SNM.   - Patient is amenable to pursuing medication. Given her baseline cognitive issues we will defer prescribing anticholinergics.   - Rx: Gemtesa 75mg 1 pill po QD. Sent this Rx to her preferred pharmacy. If Gemtesa is cost-prohibitive will send Rx for Myrbetriq and discussed that this medication potential side effects include elevating blood pressure.   - If the medication does not improve her OAB sx we will consider third-line therapy to treat her OAB such as intradetrusor Botox. Of note if she opts for intradetrusor Botox she will need to d/c taking Warfarin 2 days prior to the procedure.      2. AMH  - UA with small blood.   - Send for microscopic UA with reflex to urine cx.   - D/w the patient that she needs a hematuria w/u with orders for a CT urography (evaluate upper  tract) and cystoscopy (evaluate lower  tract) to rule out any underlying etiology of the hematuria.     Notes reviewed: discharge summary from recent admission (10/18/23) for cardiac surgery (CABG, aorta replacement)  Test results reviewed:  CTU 8/15/23:  IMPRESSION:  Small foci of cortical scarring involving the right kidney. Small  left renal cysts. Otherwise unremarkable kidneys and ureters without  cause of hematuria identified. No suspicious upper tract lesion,  hydronephrosis, or calculus.      Today she  "reports  ***        CYSTOSCOPY  Verbal and written consent was obtained and a Time Out was performed.    DOCUMENTATION OF UNIVERSAL PROTOCOL FOR INVASIVE PROCEDURES    Time-out was taken with all members of procedure team immediately prior to procedure and the following were confirmed: Correct patient identified  Agreement on procedure  Correct side and site  Correct patient position    PROCEDURE NOTE:   Type of Procedure:  cystoscopy  Procedure Date: 10/31/2023  Time:  1:50 PM  Performing Provider: Elise Gaming MD    Specimens Sent: n/a  Estimated Blood Loss: n/a    Form Completed By:  Elise Gaming MD  1:50 PM    Urine dip: No results found for this or any previous visit.   Urine hcg: ***  PVR ***        Procedure:  The patient was positioned in the cystoscopy chair. The urethra was prepped in the usual fashion with betadine or dung soap.  A straight catheter was inserted and the urinary bladder was drained.  Local anesthesia was administered with 2% lidocaine jelly transurethrally in the usual fashion. The bladder was distended with approximately 200mL of sterile water    Cystourethroscopy was performed with an Ambu flexible cystoscope.    Findings:  There were no vitals taken for this visit.  Urethra: normal mucosa, no masses or lesions, no evidence of urethral diverticula  Bladder neck: *** patulous,  *** hypermobile,  *** fixed, *** inflamatory fronding  Bladder: *** normal mucosa, no masses, no lesions, no foreign body, no diverticulum, no cystitis cysticia, no squamous metaplasia, no inflamation, no acute cystitis, no stones  Ureteral orifices:  normal position and appearance, *** efflux visualized bilaterally    The patient tolerated the procedure well: {yes,no:11935::\"Yes\"}    Impression: ***      Assessment and Plan:    1) Reviewed cystoscopy results, Suze Najera watched procedure on video screen.    2) ***    " 16-Jun-2023

## 2023-11-01 ENCOUNTER — NON-APPOINTMENT (OUTPATIENT)
Age: 58
End: 2023-11-01

## 2023-11-02 ENCOUNTER — APPOINTMENT (OUTPATIENT)
Dept: PHYSICAL MEDICINE AND REHAB | Facility: CLINIC | Age: 58
End: 2023-11-02
Payer: MEDICAID

## 2023-11-02 PROCEDURE — 99213 OFFICE O/P EST LOW 20 MIN: CPT

## 2023-11-03 ENCOUNTER — NON-APPOINTMENT (OUTPATIENT)
Age: 58
End: 2023-11-03

## 2023-11-06 ENCOUNTER — APPOINTMENT (OUTPATIENT)
Dept: ULTRASOUND IMAGING | Facility: CLINIC | Age: 58
End: 2023-11-06
Payer: MEDICAID

## 2023-11-06 PROCEDURE — 93971 EXTREMITY STUDY: CPT | Mod: RT

## 2023-11-08 ENCOUNTER — NON-APPOINTMENT (OUTPATIENT)
Age: 58
End: 2023-11-08

## 2023-11-13 ENCOUNTER — APPOINTMENT (OUTPATIENT)
Dept: PHYSICAL MEDICINE AND REHAB | Facility: CLINIC | Age: 58
End: 2023-11-13
Payer: MEDICAID

## 2023-11-13 ENCOUNTER — NON-APPOINTMENT (OUTPATIENT)
Age: 58
End: 2023-11-13

## 2023-11-13 PROCEDURE — 99213 OFFICE O/P EST LOW 20 MIN: CPT | Mod: GC

## 2023-11-15 ENCOUNTER — NON-APPOINTMENT (OUTPATIENT)
Age: 58
End: 2023-11-15

## 2023-12-07 ENCOUNTER — APPOINTMENT (OUTPATIENT)
Dept: PHYSICAL MEDICINE AND REHAB | Facility: CLINIC | Age: 58
End: 2023-12-07
Payer: MEDICAID

## 2023-12-07 PROCEDURE — 99215 OFFICE O/P EST HI 40 MIN: CPT

## 2023-12-12 ENCOUNTER — APPOINTMENT (OUTPATIENT)
Dept: MRI IMAGING | Facility: CLINIC | Age: 58
End: 2023-12-12

## 2023-12-14 NOTE — REVIEW OF SYSTEMS
[FreeTextEntry9] : tightness and decreased right upper extremity range of motion [de-identified] : right lateral/axilla breast numbness. sensitivity to touch on right breast/axilla

## 2023-12-14 NOTE — ASSESSMENT
[FreeTextEntry1] : 58-year-old female here for evaluation.  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

## 2023-12-14 NOTE — PHYSICAL EXAM
[FreeTextEntry1] : Gen: Patient is A&O x 3, NAD HEENT: EOMI, hearing grossly normal Resp: regular, non - labored CV: pulses regular Skin: no rashes, erythema Lymph: no clubbing, cyanosis, mild swelling in right upper extremity Inspection: no instability ROM: Decreased in bilateral shoulder range of motion especially in abduction (right worse than left) Palpation: Tenderness to palpation in right breast/axilla. cording felt upon palpation. Reflexes: 1+ and symmetric throughout Strength: 4+/5 throughout right upper extremity. 5/5 throughout left upper extremity Special tests: +[place] on right Gait: normal, non-antalgic

## 2023-12-14 NOTE — HISTORY OF PRESENT ILLNESS
[FreeTextEntry1] : Ms. Yenny Piper is a 58-year-old post-menopausal woman s/p right breast lumpectomy with SLN sampling (0/1 evaluated on the right) for DCIS on 6/22/23. Completed radiation to the right breast on 9/12/23.   States that she did not hear from insurance about the MRI. When she reached out to her insurance, she states that they never received it.   Her swelling and pain had worsened but the swelling improved after starting Augmentin which was started by another provider. During this time, states that the skin felt warm and there was redness. She takes Gabapentin as 600 mg 600 mg 900 mg. She could not tolerate the Gabapentin 900 mg TID due to lethargy.   She started lymphedema therapy two times a week (3 sessions) but couldn't tolerate the most recent one due to the pain. Her therapist is doing the therapy for the lymphedema and the cording.   She endorses back and right upper extremity weakness. Denies any numbness.

## 2024-01-08 ENCOUNTER — APPOINTMENT (OUTPATIENT)
Dept: PHYSICAL MEDICINE AND REHAB | Facility: CLINIC | Age: 59
End: 2024-01-08
Payer: MEDICAID

## 2024-01-08 PROCEDURE — 99213 OFFICE O/P EST LOW 20 MIN: CPT

## 2024-01-16 NOTE — REVIEW OF SYSTEMS
[Negative] : Heme/Lymph [FreeTextEntry9] : tightness and decreased right upper extremity range of motion [de-identified] : right lateral/axilla breast numbness. sensitivity to touch on right breast/axilla

## 2024-01-16 NOTE — HISTORY OF PRESENT ILLNESS
[FreeTextEntry1] : Ms. Yenny Piper is a 58-year-old post-menopausal woman s/p right breast lumpectomy with SLN sampling (0/1 evaluated on the right) for DCIS on 6/22/23. Completed radiation to the right breast on 9/12/23.   States that she did not hear from insurance about the MRI. When she reached out to her insurance, she states that they never received it.   Her swelling and pain had worsened but the swelling improved after starting Augmentin which was started by another provider. During this time, states that the skin felt warm and there was redness. She takes Gabapentin as 600 mg 600 mg 900 mg. She could not tolerate the Gabapentin 900 mg TID due to lethargy.   She started lymphedema therapy two times a week (3 sessions) but couldn't tolerate the most recent one due to the pain. Her therapist is doing the therapy for the lymphedema and the cording.   She endorses back and right upper extremity weakness, weakness is improving.

## 2024-01-16 NOTE — ASSESSMENT
[FreeTextEntry1] : 58-year-old female here for evaluation.  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

## 2024-01-22 ENCOUNTER — APPOINTMENT (OUTPATIENT)
Dept: MRI IMAGING | Facility: CLINIC | Age: 59
End: 2024-01-22

## 2024-02-08 ENCOUNTER — APPOINTMENT (OUTPATIENT)
Dept: PHYSICAL MEDICINE AND REHAB | Facility: CLINIC | Age: 59
End: 2024-02-08
Payer: MEDICAID

## 2024-02-08 VITALS
OXYGEN SATURATION: 98 % | HEART RATE: 90 BPM | TEMPERATURE: 97.8 F | SYSTOLIC BLOOD PRESSURE: 125 MMHG | DIASTOLIC BLOOD PRESSURE: 79 MMHG | RESPIRATION RATE: 14 BRPM

## 2024-02-08 PROCEDURE — 99215 OFFICE O/P EST HI 40 MIN: CPT | Mod: GC

## 2024-02-12 NOTE — PROCEDURAL SAFETY CHECKLIST WITH OR WITHOUT SEDATION - NSSPECIALEQUIPSUPPLY_GEN_ALL_CORE
As per MD Riccardo Christian ok to give pain meds, recheck blood pressure when pain reassessment is done. done PA stated he will dc the [facility] labs

## 2024-02-13 NOTE — REVIEW OF SYSTEMS
[Negative] : Psychiatric [FreeTextEntry9] : tightness and decreased right upper extremity range of motion improving. [de-identified] : right lateral/axilla breast numbness. sensitivity to touch on right breast/axilla [de-identified] : Right upper extremity swelling [FreeTextEntry1] : Right breast lumps and discharge

## 2024-02-13 NOTE — HISTORY OF PRESENT ILLNESS
[FreeTextEntry1] : Ms. Yenny Piper is a 58-year-old post-menopausal woman s/p right breast lumpectomy with SLN sampling (0/1 evaluated on the right) for DCIS on 6/22/23. Completed radiation to the right breast on 9/12/23.   She developed right nipple spontaneous creamy white discharge (no blood) and 2 breast lumps (lateral and upper middle) with tenderness to pain for 1 month. She feels like the lumps are getting larger. Denies any erythema. States that she saw her medical oncologist and breast surgeon recently. States that her breast surgeon felt it was secondary to radiation. She is scheduled to see her radiation oncologist on 2/16/24. States that her medical oncologist recommended follow-up after seeing all of the patient's providers. She last had breast imaging in 5/2023 (mammogram) and 6/2023 (MRI)  Therapy was helping but she is taking a break due to pain and discharge.   States that lymphedema is stable.    Gabapentin 900 mg HS helps. She did not start the Duloxetine and asked us to re-send it.   She endorses back and right upper extremity weakness is improving.

## 2024-02-13 NOTE — PHYSICAL EXAM
[FreeTextEntry1] : Gen: Patient is A&O x 3, NAD HEENT: EOMI, hearing grossly normal Resp: regular, non - labored CV: pulses regular Breast/Nipple: Right lateral and right mid-upper breast mobile lumps felt along prior incision tender to touch. No spontaneous or expressed nipple discharge Skin: no rashes, erythema Lymph: no clubbing, cyanosis, mild swelling in right upper extremity Inspection: no instability ROM: Decreased in bilateral shoulder range of motion especially in abduction (right worse than left) Palpation: Tenderness to palpation in right breast/axilla.  Reflexes: 1+ and symmetric throughout Strength: 4+/5 throughout right upper extremity. 5/5 throughout left upper extremity. Gait: normal, non-antalgic

## 2024-02-13 NOTE — ASSESSMENT
[FreeTextEntry1] : 58-year-old female here for evaluation.  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     -Follow-up in 1 month

## 2024-02-16 ENCOUNTER — NON-APPOINTMENT (OUTPATIENT)
Age: 59
End: 2024-02-16

## 2024-02-16 ENCOUNTER — APPOINTMENT (OUTPATIENT)
Dept: RADIATION ONCOLOGY | Facility: CLINIC | Age: 59
End: 2024-02-16
Payer: MEDICAID

## 2024-02-16 VITALS
RESPIRATION RATE: 16 BRPM | TEMPERATURE: 96.1 F | DIASTOLIC BLOOD PRESSURE: 82 MMHG | WEIGHT: 170.08 LBS | HEART RATE: 79 BPM | BODY MASS INDEX: 30.14 KG/M2 | HEIGHT: 63 IN | OXYGEN SATURATION: 100 % | SYSTOLIC BLOOD PRESSURE: 126 MMHG

## 2024-02-16 PROCEDURE — 99213 OFFICE O/P EST LOW 20 MIN: CPT

## 2024-02-16 RX ORDER — SILVER SULFADIAZINE 10 MG/G
1 CREAM TOPICAL TWICE DAILY
Qty: 1 | Refills: 0 | Status: DISCONTINUED | COMMUNITY
Start: 2023-09-19 | End: 2024-02-16

## 2024-02-16 RX ORDER — ONDANSETRON 4 MG/1
4 TABLET ORAL
Qty: 30 | Refills: 0 | Status: DISCONTINUED | COMMUNITY
Start: 2023-08-29 | End: 2024-02-16

## 2024-02-16 NOTE — REVIEW OF SYSTEMS
[Fatigue] : fatigue [Joint Pain] : joint pain [Muscle Pain] : muscle pain [Insomnia] : insomnia [Negative] : Allergic/Immunologic [Fatigue: Grade 0] : Fatigue: Grade 0 [Skin Hyperpigmentation: Grade 1 - Hyperpigmentation covering <10% BSA; no psychosocial impact] : Skin Hyperpigmentation: Grade 1 - Hyperpigmentation covering <10% BSA; no psychosocial impact [FreeTextEntry9] : Right upper extremity

## 2024-02-16 NOTE — PHYSICAL EXAM
[Sclera] : the sclera and conjunctiva were normal [Bowel Sounds] : normal bowel sounds [Normal] : normal skin color and pigmentation and no rash [No Focal Deficits] : no focal deficits [Oriented To Time, Place, And Person] : oriented to person, place, and time [de-identified] : right breast smaller than right with surgiacla absences of tissueRUOQ hyperpigmentation in treated breast Incision smooth subcuaneous lesion at medial end of scar with similar lesion more toward medial aspect no nipple discharge today [de-identified] : Right shoulder decreased ROM

## 2024-02-16 NOTE — HISTORY OF PRESENT ILLNESS
[FreeTextEntry1] : Ms. Piper is a 57 yr old post-menopausal woman s/p right breast conserving surgery with SLN sampling (0/1) for DCIS on 6/22/23. ER/CO positive.  9/12/2023 She completed radiation therapy: 4005cGy with concurrent 795cGy boost, in 15 fractions to the right breast.  She was seen for post treatment skin check on 9/26/2023 with progressively worsening radiation desquamation, and started on Silvadene.  Last seen Oct 2023 for a PTE. While on treatment she had c/o nausea managed with sage supplements. Also had fatigue and hyperpigmentation. Skin changes is resolved. Fatigue is improved, though reports sleep changes. She established care with PM&R Dr David for right arm tightness, mild right upper extremity swelling and decreased right shoulder range of motion. Started on Gabapentin and cancer rehab therapy. Today she continues on PT 2x/week. Reports pain to right breast with some lumpiness to right breast surgical area. She was prescribed Anastrozole, yet to start. Will follow up with [facility] Dr Zhong on 10/19/2023. Follow up with Breast surgeon Dr De Souza on 10/28/2023  Today, she presents for a follow up. Last month she felt two lumps on the right breast with brown nipple discharge. Has pain with touch. Nipple discharge is intermittent. She went back to the surgeon and was told it was radiation. Follow up with breast surgeon scheduled in May 2024. No scheduled imaging besides bone density 2/23/24. Continues to follow up with PMR, Gabapentin helping. Reports back and right UE weakness is improving. Continues with anastrozole and tolerating. Follow up with Dr. Zhong as scheduled

## 2024-02-16 NOTE — ED ADULT NURSE NOTE - FALLEN IN THE PAST
Caller: patient    Doctor: Jennifer    Reason for call: pt returned the office phone call, it was explained to the pt that the office does not par with insurance.  She does have an appt sched with a Ortho provider that does take her insurance     Call back#: [phone]  
Spoke to patient informing her that we do not accept her insurance.  We currently have [facility] listed as the insurance carrier.  She stated it is [employer]  Please make sure insurance is par with  ke's.  She has previously had her appointments written off due to insurance not being accepted.    2/16 LB  
no

## 2024-02-16 NOTE — VITALS
[Maximal Pain Intensity: 7/10] : 7/10 [Least Pain Intensity: 5/10] : 5/10 [Pain Location: ___] : Pain Location: [unfilled] [Pain Interferes with ADLs] : Pain interferes with activities of daily living. [Adjuvant (neuroleptics)] : adjuvant (neuroleptics) [90: Able to carry normal activity; minor signs or symptoms of disease.] : 90: Able to carry normal activity; minor signs or symptoms of disease.

## 2024-02-23 ENCOUNTER — APPOINTMENT (OUTPATIENT)
Dept: RADIOLOGY | Facility: IMAGING CENTER | Age: 59
End: 2024-02-23

## 2024-03-06 ENCOUNTER — APPOINTMENT (OUTPATIENT)
Dept: RADIOLOGY | Facility: IMAGING CENTER | Age: 59
End: 2024-03-06
Payer: MEDICAID

## 2024-03-06 ENCOUNTER — OUTPATIENT (OUTPATIENT)
Dept: OUTPATIENT SERVICES | Facility: HOSPITAL | Age: 59
LOS: 1 days | End: 2024-03-06
Payer: MEDICAID

## 2024-03-06 DIAGNOSIS — Z90.49 ACQUIRED ABSENCE OF OTHER SPECIFIED PARTS OF DIGESTIVE TRACT: Chronic | ICD-10-CM

## 2024-03-06 DIAGNOSIS — Z00.8 ENCOUNTER FOR OTHER GENERAL EXAMINATION: ICD-10-CM

## 2024-03-06 DIAGNOSIS — Z90.710 ACQUIRED ABSENCE OF BOTH CERVIX AND UTERUS: Chronic | ICD-10-CM

## 2024-03-06 DIAGNOSIS — Z98.890 OTHER SPECIFIED POSTPROCEDURAL STATES: Chronic | ICD-10-CM

## 2024-03-06 PROCEDURE — 77080 DXA BONE DENSITY AXIAL: CPT | Mod: 26

## 2024-03-06 PROCEDURE — 77080 DXA BONE DENSITY AXIAL: CPT

## 2024-03-07 ENCOUNTER — APPOINTMENT (OUTPATIENT)
Dept: ULTRASOUND IMAGING | Facility: IMAGING CENTER | Age: 59
End: 2024-03-07
Payer: MEDICAID

## 2024-03-07 ENCOUNTER — APPOINTMENT (OUTPATIENT)
Dept: MAMMOGRAPHY | Facility: IMAGING CENTER | Age: 59
End: 2024-03-07
Payer: MEDICAID

## 2024-03-07 ENCOUNTER — RESULT REVIEW (OUTPATIENT)
Age: 59
End: 2024-03-07

## 2024-03-07 ENCOUNTER — APPOINTMENT (OUTPATIENT)
Dept: PHYSICAL MEDICINE AND REHAB | Facility: CLINIC | Age: 59
End: 2024-03-07
Payer: MEDICAID

## 2024-03-07 ENCOUNTER — OUTPATIENT (OUTPATIENT)
Dept: OUTPATIENT SERVICES | Facility: HOSPITAL | Age: 59
LOS: 1 days | End: 2024-03-07
Payer: MEDICAID

## 2024-03-07 VITALS
TEMPERATURE: 98.1 F | HEART RATE: 74 BPM | RESPIRATION RATE: 16 BRPM | DIASTOLIC BLOOD PRESSURE: 81 MMHG | SYSTOLIC BLOOD PRESSURE: 124 MMHG | OXYGEN SATURATION: 100 %

## 2024-03-07 DIAGNOSIS — Z90.710 ACQUIRED ABSENCE OF BOTH CERVIX AND UTERUS: Chronic | ICD-10-CM

## 2024-03-07 DIAGNOSIS — D05.11 INTRADUCTAL CARCINOMA IN SITU OF RIGHT BREAST: ICD-10-CM

## 2024-03-07 DIAGNOSIS — Z90.49 ACQUIRED ABSENCE OF OTHER SPECIFIED PARTS OF DIGESTIVE TRACT: Chronic | ICD-10-CM

## 2024-03-07 PROCEDURE — 99214 OFFICE O/P EST MOD 30 MIN: CPT

## 2024-03-07 PROCEDURE — 77065 DX MAMMO INCL CAD UNI: CPT | Mod: 26,RT

## 2024-03-07 PROCEDURE — 77065 DX MAMMO INCL CAD UNI: CPT

## 2024-03-07 PROCEDURE — 76641 ULTRASOUND BREAST COMPLETE: CPT | Mod: 26,RT

## 2024-03-07 PROCEDURE — 77061 BREAST TOMOSYNTHESIS UNI: CPT | Mod: 26

## 2024-03-07 PROCEDURE — G0279: CPT

## 2024-03-07 PROCEDURE — 76641 ULTRASOUND BREAST COMPLETE: CPT

## 2024-03-07 NOTE — PHYSICAL EXAM
[FreeTextEntry1] : Gen: Patient is A&O x 3, NAD HEENT: EOMI, hearing grossly normal Resp: regular, non - labored CV: pulses regular Skin: no rashes, erythema Lymph: no clubbing, cyanosis, mild swelling in right upper extremity Inspection: no instability ROM: Decreased in bilateral shoulder range of motion especially in abduction (right worse than left) Palpation: Discomfort to palpation in right breast/axilla.  Reflexes: 1+ and symmetric throughout Strength: 5/5 throughout bilateral upper extremities Gait: normal, non-antalgic

## 2024-03-07 NOTE — REVIEW OF SYSTEMS
[Negative] : Psychiatric [de-identified] : Right upper extremity swelling [FreeTextEntry9] : tightness and decreased right upper extremity range of motion improving. [de-identified] : right lateral/axilla breast numbness. sensitivity to touch on right breast/axilla

## 2024-03-07 NOTE — END OF VISIT
[FreeTextEntry3] :  Patient seen and examined with fellow Dr. Ayala [Time Spent: ___ minutes] : I have spent [unfilled] minutes of time on the encounter.

## 2024-03-07 NOTE — ASSESSMENT
[FreeTextEntry1] : 58-year-old female here for evaluation.  #Right breast masses with nipple discharge -Breast surgeon and radiation oncologist feel that likely secondary to radiation changes/necrosis -She had repeat breast imaging done today -Follow-up with medical oncologist, breast surgeon and radiation oncologist as scheduled  #Post-mastectomy pain syndrome/Neuropathic pain -Continue Gabapentin 900 mg HS -She previously tried Duloxetine 30 mg daily in 12/2023 but then never called for a refill. She prefers not to restart.  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

## 2024-03-07 NOTE — HISTORY OF PRESENT ILLNESS
[FreeTextEntry1] : Ms. Yenny Piper is a 58-year-old post-menopausal woman s/p right breast lumpectomy with SLN sampling (0/1 evaluated on the right) for DCIS on 6/22/23. Completed radiation to the right breast on 9/12/23.   She only has mild intermittent nipple discharge. She has a follow-up with her breast surgeon coming up soon. She also saw her radiation oncologist recently. Everyone feels that the clinical picture is consistent with radiation changes/necrosis. She states that she had breast imaging done today. She is feeling better. She still has some pain, but it is manageable on Gabapentin. States that she took Duloxetine last December 2023 but then never called for a refill. She is unsure if Duloxetine helped and doesn't want to add it now.   States that her lymphedema has not worsened since therapy was put on hold. She would like to restart lymphedema therapy and also work on right shoulder range of motion.   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

## 2024-05-06 ENCOUNTER — APPOINTMENT (OUTPATIENT)
Dept: PHYSICAL MEDICINE AND REHAB | Facility: CLINIC | Age: 59
End: 2024-05-06
Payer: MEDICAID

## 2024-05-06 DIAGNOSIS — L76.82 OTHER POSTPROCEDURAL COMPLICATIONS OF SKIN AND SUBCUTANEOUS TISSUE: ICD-10-CM

## 2024-05-06 DIAGNOSIS — I89.0 LYMPHEDEMA, NOT ELSEWHERE CLASSIFIED: ICD-10-CM

## 2024-05-06 DIAGNOSIS — L90.5 OTHER POSTPROCEDURAL COMPLICATIONS OF SKIN AND SUBCUTANEOUS TISSUE: ICD-10-CM

## 2024-05-06 DIAGNOSIS — M79.2 NEURALGIA AND NEURITIS, UNSPECIFIED: ICD-10-CM

## 2024-05-06 DIAGNOSIS — G89.18 OTHER ACUTE POSTPROCEDURAL PAIN: ICD-10-CM

## 2024-05-06 PROCEDURE — 99214 OFFICE O/P EST MOD 30 MIN: CPT

## 2024-05-06 RX ORDER — DULOXETINE HYDROCHLORIDE 30 MG/1
30 CAPSULE, DELAYED RELEASE PELLETS ORAL
Qty: 30 | Refills: 0 | Status: DISCONTINUED | COMMUNITY
Start: 2023-12-11 | End: 2024-05-06

## 2024-05-06 RX ORDER — GABAPENTIN 300 MG/1
300 CAPSULE ORAL
Qty: 90 | Refills: 2 | Status: ACTIVE | COMMUNITY
Start: 2023-09-21 | End: 1900-01-01

## 2024-05-06 RX ORDER — DULOXETINE HYDROCHLORIDE 30 MG/1
30 CAPSULE, DELAYED RELEASE PELLETS ORAL
Qty: 30 | Refills: 0 | Status: ACTIVE | COMMUNITY
Start: 2024-05-06 | End: 1900-01-01

## 2024-05-06 NOTE — REVIEW OF SYSTEMS
[Negative] : Psychiatric [FreeTextEntry9] : tightness and decreased right upper extremity range of motion improving improving [de-identified] : right lateral/axilla breast numbness. sensitivity to touch on right breast/axilla improving [de-identified] : Right upper extremity swelling unchanged

## 2024-05-06 NOTE — ASSESSMENT
[FreeTextEntry1] : 58-year-old female here for evaluation.  #Right breast masses with nipple discharge -Nipple discharge improving -Breast surgeon and radiation oncologist feel that likely secondary to radiation changes/necrosis -Follow-up with medical oncologist, breast surgeon and radiation oncologist as scheduled -Patient would like a referral to a different breast surgeon in [facility]-referral placed  #Right breast pain/Neuropathic pain -Improving but insufficient -Continue Gabapentin 900 mg HS-refill sent -She previously tried Duloxetine 30 mg daily in 12/2023 but then never called for a refill. 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

## 2024-05-06 NOTE — HISTORY OF PRESENT ILLNESS
[FreeTextEntry1] : Ms. Yenny Piper is a 58-year-old post-menopausal woman s/p right breast lumpectomy with SLN sampling (0/1 evaluated on the right) for DCIS on 6/22/23. Completed radiation to the right breast on 9/12/23.   She did not get to start therapy due to two deaths in the family. She is going to call them to start therapy.   She rarely has mild nipple discharge. She still has some breast pain and Gabapentin helps. She doesn't want to take Gabapentin during the day because it makes her too sleepy. She doesn't want to adjust the dose.   She is scheduled for repeat breast imaging in June 2024. States that she is unsure if they are going to do a biopsy or not for the breast cysts. She would like a referral for a new breast surgeon.

## 2024-05-06 NOTE — DATA REVIEWED
[FreeTextEntry1] : March 2024 Breast MRI/ultrasound: No mammographic or sonographic evidence of malignancy in the right breast at this time. No radiographic evidence of changes of a suspicious nature since the prior images. cysts are seen on sonography. Clinical follow-up is recommended.

## 2024-05-06 NOTE — END OF VISIT
[Time Spent: ___ minutes] : I have spent [unfilled] minutes of time on the encounter. [FreeTextEntry3] :  Patient seen and examined with fellow Dr. Ayala

## 2024-05-17 ENCOUNTER — APPOINTMENT (OUTPATIENT)
Dept: MRI IMAGING | Facility: IMAGING CENTER | Age: 59
End: 2024-05-17
Payer: MEDICAID

## 2024-05-17 ENCOUNTER — OUTPATIENT (OUTPATIENT)
Dept: OUTPATIENT SERVICES | Facility: HOSPITAL | Age: 59
LOS: 1 days | End: 2024-05-17
Payer: MEDICAID

## 2024-05-17 DIAGNOSIS — Z92.3 PERSONAL HISTORY OF IRRADIATION: ICD-10-CM

## 2024-05-17 DIAGNOSIS — Z90.49 ACQUIRED ABSENCE OF OTHER SPECIFIED PARTS OF DIGESTIVE TRACT: Chronic | ICD-10-CM

## 2024-05-17 DIAGNOSIS — Z90.710 ACQUIRED ABSENCE OF BOTH CERVIX AND UTERUS: Chronic | ICD-10-CM

## 2024-05-17 DIAGNOSIS — Z98.890 OTHER SPECIFIED POSTPROCEDURAL STATES: Chronic | ICD-10-CM

## 2024-05-17 DIAGNOSIS — Z00.8 ENCOUNTER FOR OTHER GENERAL EXAMINATION: ICD-10-CM

## 2024-05-17 DIAGNOSIS — D05.11 INTRADUCTAL CARCINOMA IN SITU OF RIGHT BREAST: ICD-10-CM

## 2024-05-17 PROCEDURE — C8908: CPT

## 2024-05-17 PROCEDURE — C8937: CPT

## 2024-05-17 PROCEDURE — 77049 MRI BREAST C-+ W/CAD BI: CPT | Mod: 26

## 2024-05-17 PROCEDURE — A9585: CPT

## 2024-06-05 ENCOUNTER — APPOINTMENT (OUTPATIENT)
Dept: SURGICAL ONCOLOGY | Facility: CLINIC | Age: 59
End: 2024-06-05
Payer: MEDICAID

## 2024-06-05 ENCOUNTER — APPOINTMENT (OUTPATIENT)
Dept: RADIATION ONCOLOGY | Facility: CLINIC | Age: 59
End: 2024-06-05
Payer: MEDICAID

## 2024-06-05 VITALS
HEIGHT: 64 IN | OXYGEN SATURATION: 100 % | DIASTOLIC BLOOD PRESSURE: 85 MMHG | HEART RATE: 73 BPM | RESPIRATION RATE: 16 BRPM | BODY MASS INDEX: 29.68 KG/M2 | WEIGHT: 173.83 LBS | SYSTOLIC BLOOD PRESSURE: 134 MMHG | TEMPERATURE: 97.3 F

## 2024-06-05 DIAGNOSIS — N64.4 MASTODYNIA: ICD-10-CM

## 2024-06-05 DIAGNOSIS — N63.0 UNSPECIFIED LUMP IN UNSPECIFIED BREAST: ICD-10-CM

## 2024-06-05 DIAGNOSIS — D05.11 INTRADUCTAL CARCINOMA IN SITU OF RIGHT BREAST: ICD-10-CM

## 2024-06-05 DIAGNOSIS — N64.52 NIPPLE DISCHARGE: ICD-10-CM

## 2024-06-05 DIAGNOSIS — R92.30 DENSE BREASTS, UNSPECIFIED: ICD-10-CM

## 2024-06-05 DIAGNOSIS — Z86.19 PERSONAL HISTORY OF OTHER INFECTIOUS AND PARASITIC DISEASES: ICD-10-CM

## 2024-06-05 DIAGNOSIS — Z92.3 PERSONAL HISTORY OF IRRADIATION: ICD-10-CM

## 2024-06-05 DIAGNOSIS — Z80.3 FAMILY HISTORY OF MALIGNANT NEOPLASM OF BREAST: ICD-10-CM

## 2024-06-05 PROCEDURE — 99212 OFFICE O/P EST SF 10 MIN: CPT

## 2024-06-05 PROCEDURE — 99204 OFFICE O/P NEW MOD 45 MIN: CPT

## 2024-06-05 RX ORDER — LIDOCAINE 4% 4 G/100G
4 CREAM TOPICAL
Qty: 1 | Refills: 3 | Status: DISCONTINUED | COMMUNITY
Start: 2023-09-21 | End: 2024-06-05

## 2024-06-05 RX ORDER — METHYLPREDNISOLONE 4 MG/1
4 TABLET ORAL
Qty: 1 | Refills: 0 | Status: DISCONTINUED | COMMUNITY
Start: 2023-11-02 | End: 2024-06-05

## 2024-06-05 RX ORDER — ALPRAZOLAM 0.25 MG/1
0.25 TABLET ORAL
Qty: 2 | Refills: 0 | Status: ACTIVE | COMMUNITY
Start: 2024-06-05 | End: 1900-01-01

## 2024-06-05 RX ORDER — NYSTATIN 100000 [USP'U]/G
100000 CREAM TOPICAL TWICE DAILY
Qty: 1 | Refills: 0 | Status: ACTIVE | COMMUNITY
Start: 2024-06-05 | End: 1900-01-01

## 2024-06-05 RX ORDER — ALPRAZOLAM 0.25 MG/1
0.25 TABLET ORAL
Qty: 2 | Refills: 0 | Status: DISCONTINUED | COMMUNITY
Start: 2024-05-14 | End: 2024-06-05

## 2024-06-05 RX ORDER — ALPRAZOLAM 0.25 MG/1
0.25 TABLET ORAL
Qty: 2 | Refills: 0 | Status: DISCONTINUED | COMMUNITY
Start: 2024-01-08 | End: 2024-06-05

## 2024-06-05 RX ORDER — METFORMIN ER 500 MG 500 MG/1
500 TABLET ORAL
Refills: 0 | Status: ACTIVE | COMMUNITY

## 2024-06-05 NOTE — HISTORY OF PRESENT ILLNESS
[FreeTextEntry1] : This is a 58 year-old F referred by Dr. Dixie David for evaluation of history of R DCIS, new complaint of Right nipple discharge, R breast lumps, here for an initial consultation.  Oncologic history: Patient underwent a R breast lumpectomy on 2023 with Dr. Angelina De Souza for DCIS (8 mm, G2, ER+ %, MN+ 71-80%).  [place] lymph node biopsy yielded two benign lymph nodes (TisN0, AJCC Stage 0).  She completed 16 fx of adjuvant RT 2023 with Dr. Franny Herbert.  She continues adjuvant anastrozole with Dr. Zhong.  She established care with PM&R Dr David for right arm tightness, mild right upper extremity swelling and decreased right shoulder range of motion. Started on Gabapentin and cancer rehab therapy.  The patient reports that in January, the patient noticed spontaneous right nipple discharge again.  Previously the nipple discharge led to the diagnosis of DCIS in the right breast.  Discharge was noted to be yellow and clear.  Around the same time, the patient noted lumps in the right breast which are painful.  She denies noticing these masses immediately after surgery or after radiation.  She was sent for diagnostic imaging which did not show any additional findings.  Recent imaging: 3/7/2024 R DM (NW) revealed extremely dense breasts -R new marked skin thickening likely postop/post RT -R postop scarring and surgical clips R axilla -BR2---> given c/o brown right nipple discharge, f/u surgical consultation  3/7/2024 R US (NW) -R 9:00 N5 (palp) 1 cm complicated cyst with fluid/fluid level -R 11:00 N4 (palp) 5 mm cyst -R 10:00 N5 minimal scarring -R RA and UOQ diffuse edema likely post treatment change -BR2---> given c/o brown right nipple discharge, f/u surgical consultation  2024 Breast MRI (NW)  -R lumpectomy and RT changes including few areas of fat necrosis one of which corresponds with cyst on sono at 9:00--> f/u MRI in 6 months -L neg -BR3   PMH: HTN, DM, hypothyroidism, asthma, HLD PSH: Right lumpectomy, hysterectomy Meds: Amlodipine, atorvastatin, ramipril, anastrozole, Farxiga, fenofibrate, Januvia, omeprazole, levothyroxine, topiramate, metformin, gabapentin, duloxetine, Proventil ALL: Denies SH: No tobacco, no EtOH FH: No breast cancer.  Brother with lung cancer, non-smoker.  No other cancers in first or secondary relatives GYN: Menarche 10.  Menopause 40s.  .  Age of first full-term pregnancy 22.  Breast-feeding 1.5 years.  OCP x 2 years.  Fertility none.  HRT none.

## 2024-06-05 NOTE — PAST MEDICAL HISTORY
[Postmenopausal] : The patient is postmenopausal [Menarche Age ____] : age at menarche was [unfilled] [Menopause Age____] : age at menopause was [unfilled] [Total Preg ___] : G[unfilled] [Live Births ___] : P[unfilled]  [Age At Live Birth ___] : Age at live birth: [unfilled] [History of Hormone Replacement Treatment] : has no history of hormone replacement treatment [FreeTextEntry6] : Denies [FreeTextEntry7] : 2 years [FreeTextEntry8] : 1.5 years

## 2024-06-05 NOTE — ASSESSMENT
[FreeTextEntry1] : This is a 58 year-old F referred by Dr. Dixie David for evaluation of history of R DCIS, Right nipple discharge, Right breast mass and pain, here for an initial consultation.  We reviewed the patient's history and currently symptoms. We reviewed her recent diagnostic imaging which returned benign and probably benign post-surgical, post-radiation changes. We discussed that she is overdue for her annual screening of the left breast, which she will get done now. She will follow-up with an MRI in 6 months.   PLAN: - Monitor nipple discharge -Continue anastrozole per med onc -Continue PMR follow up -Breast MRI 11/2024 -Last SM 4/18/2023, pt is overdue for Left breast screening--L mammo/US now - RTO 6 months

## 2024-06-05 NOTE — CONSULT LETTER
[Dear  ___] : Dear  [unfilled], [Consult Letter:] : I had the pleasure of evaluating your patient, [unfilled]. [Please see my note below.] : Please see my note below. [Consult Closing:] : Thank you very much for allowing me to participate in the care of this patient.  If you have any questions, please do not hesitate to contact me. [Sincerely,] : Sincerely, [FreeTextEntry2] : Dixie David DO [FreeTextEntry3] : Alexandria Doe MD Breast Surgeon Division of Surgical Oncology Department of Surgery [address] [place] [address] Tel: [phone] Fax: [phone] Email: [email or web address] [DrViry  ___] : Dr. ESTRELLA

## 2024-06-05 NOTE — VITALS
[Least Pain Intensity: 5/10] : 5/10 [Pain Location: ___] : Pain Location: [unfilled] [Pain Interferes with ADLs] : Pain interferes with activities of daily living. [Adjuvant (neuroleptics)] : adjuvant (neuroleptics) [Maximal Pain Intensity: 8/10] : 8/10 [80: Normal activity with effort; some signs or symptoms of disease.] : 80: Normal activity with effort; some signs or symptoms of disease.

## 2024-06-05 NOTE — REVIEW OF SYSTEMS
[Skin Hyperpigmentation: Grade 1 - Hyperpigmentation covering <10% BSA; no psychosocial impact] : Skin Hyperpigmentation: Grade 1 - Hyperpigmentation covering <10% BSA; no psychosocial impact [Fatigue: Grade 1 - Fatigue relieved by rest] : Fatigue: Grade 1 - Fatigue relieved by rest [Breast Pain: Grade 2 - Moderate pain; limiting instrumental ADL] : Breast Pain: Grade 2 - Moderate pain; limiting instrumental ADL [Pruritus: Grade 0] : Pruritus: Grade 0

## 2024-06-05 NOTE — PHYSICAL EXAM
[Normocephalic] : normocephalic [Atraumatic] : atraumatic [EOMI] : extra ocular movement intact [PERRL] : pupils equal, round and reactive to light [Sclera nonicteric] : sclera nonicteric [Supple] : supple [No Supraclavicular Adenopathy] : no supraclavicular adenopathy [Examined in the supine and seated position] : examined in the supine and seated position [No dominant masses] : no dominant masses in right breast  [No dominant masses] : no dominant masses left breast [No Nipple Retraction] : no left nipple retraction [No Nipple Discharge] : no left nipple discharge [No Axillary Lymphadenopathy] : no left axillary lymphadenopathy [No Edema] : no edema [No Rashes] : no rashes [No Ulceration] : no ulceration [Breast Mass Left Breast ___cm] : no masses [Breast Nipple Inversion] : nipples not inverted [Breast Nipple Retraction] : nipples not retracted [Breast Nipple Flattening] : nipples not flattened [Breast Nipple Fissures] : nipples not fissured [de-identified] : non-labored respirations  [de-identified] : superior breast transverse incision with nodularity above NAC and laterally below incision, tender throughout right chest wall. No nipple discharge. Post RT skin changes noted

## 2024-06-12 PROBLEM — D05.11 DUCTAL CARCINOMA IN SITU (DCIS) OF RIGHT BREAST: Status: ACTIVE | Noted: 2023-08-02

## 2024-06-12 PROBLEM — Z86.19 HISTORY OF CANDIDIASIS OF SKIN AND NAILS: Status: ACTIVE | Noted: 2024-06-05

## 2024-06-12 PROBLEM — Z92.3 H/O THERAPEUTIC RADIATION: Status: ACTIVE | Noted: 2024-02-16

## 2024-06-12 NOTE — PHYSICAL EXAM
[Normal] : normal skin color and pigmentation and no rash [Oriented To Time, Place, And Person] : oriented to person, place, and time [] : no respiratory distress [Respiration, Rhythm And Depth] : normal respiratory rhythm and effort [Affect] : the affect was normal [de-identified] : right breast smaller than right with surgical aof tissue RUOQ hyperpigmentation in treated breast Incision smooth subcuaneous lesion at medial end of scar with similar lesion more toward medial aspect no nipple discharge today [de-identified] : Right shoulder decreased ROM

## 2024-06-12 NOTE — HISTORY OF PRESENT ILLNESS
[FreeTextEntry1] : Ms. Piper is a 58 yr old post-menopausal woman s/p right breast conserving surgery with SLN sampling (0/1) for DCIS on 6/22/23. ER/WI positive. She completed radiation therapy to the right breast on 9/12/23; 4005cGy with concurrent 795cGy boost in 15 fx.   9/26/2023: Seen for post-treatment skin check, with progressively worsening radiation desquamation; started on Silvadene.  10/2023: Seen for PTE. While on treatment she had c/o nausea managed with sage supplements. Also had fatigue and hyperpigmentation. Skin changes resolved. Fatigue improved, though reports sleep changes. She established care with PM&R Dr David for right arm tightness, mild right upper extremity swelling and decreased right shoulder range of motion. Started on Gabapentin and cancer rehab therapy. Today she continues on PT 2x/week. Reports pain to right breast with some lumpiness to right breast surgical area. She was prescribed Anastrozole, yet to start. Will follow up with [facility] Dr Zhong on 10/19/2023. Follow up with Breast surgeon Dr De Souza on 10/28/2023  2/16/2024: She presents for a follow up. Last month she felt two lumps on the right breast with brown nipple discharge. Has pain with touch. Nipple discharge is intermittent. She went back to the surgeon and was told it was radiation. Follow up with breast surgeon scheduled in May 2024. No scheduled imaging besides bone density 2/23/24. Continues to follow up with PMR, Gabapentin helping. Reports back and right UE weakness is improving. Continues with anastrozole and tolerating. Follow up with Dr. Zhong as scheduled.   3/7/2024 Right breast Mammo/Sono: Dense breast. No mammographic or sonographic evidence of malignancy in the right breast at this time. No radiographic evidence of changes of a suspicious nature since the prior images. At the palpable areas of concern at 9:00 and 11:00 on the right, no mammographic abnormalities are seen. However, cysts are seen on sonography, as described above. Clinical follow-up is recommended. No mammographic or sonographic findings to explain the patient's brownish right nipple discharge. Because of this complaint however, surgical consultation is recommended.  5/17/2024 Breast MRI: Nonmass enhancement involving the central right breast measuring 6.8 cm likely reflecting post radiation changes. MRI in 6 months is recommended to ascertain stability. Bi-Rads 3.   6/5/2024: Presents today for follow-up. Also seeing Dr. Doe today.  Continues f/u with PM&R, on Gabapentin qHS and started Duloxetine for constant right breast pain 8/10. Has decreased ROM right arm.  Will resume Lymphedema RUE, lymphatic drainage therapy 6/12/2024

## 2024-07-03 ENCOUNTER — RESULT REVIEW (OUTPATIENT)
Age: 59
End: 2024-07-03

## 2024-07-03 ENCOUNTER — APPOINTMENT (OUTPATIENT)
Dept: ULTRASOUND IMAGING | Facility: IMAGING CENTER | Age: 59
End: 2024-07-03
Payer: MEDICAID

## 2024-07-03 ENCOUNTER — OUTPATIENT (OUTPATIENT)
Dept: OUTPATIENT SERVICES | Facility: HOSPITAL | Age: 59
LOS: 1 days | End: 2024-07-03
Payer: MEDICAID

## 2024-07-03 ENCOUNTER — APPOINTMENT (OUTPATIENT)
Dept: MAMMOGRAPHY | Facility: IMAGING CENTER | Age: 59
End: 2024-07-03
Payer: MEDICAID

## 2024-07-03 DIAGNOSIS — Z90.49 ACQUIRED ABSENCE OF OTHER SPECIFIED PARTS OF DIGESTIVE TRACT: Chronic | ICD-10-CM

## 2024-07-03 DIAGNOSIS — Z98.890 OTHER SPECIFIED POSTPROCEDURAL STATES: Chronic | ICD-10-CM

## 2024-07-03 DIAGNOSIS — Z00.8 ENCOUNTER FOR OTHER GENERAL EXAMINATION: ICD-10-CM

## 2024-07-03 DIAGNOSIS — R92.30 DENSE BREASTS, UNSPECIFIED: ICD-10-CM

## 2024-07-03 DIAGNOSIS — Z90.710 ACQUIRED ABSENCE OF BOTH CERVIX AND UTERUS: Chronic | ICD-10-CM

## 2024-07-03 DIAGNOSIS — D05.11 INTRADUCTAL CARCINOMA IN SITU OF RIGHT BREAST: ICD-10-CM

## 2024-07-03 PROCEDURE — 76641 ULTRASOUND BREAST COMPLETE: CPT

## 2024-07-03 PROCEDURE — 76641 ULTRASOUND BREAST COMPLETE: CPT | Mod: 26,LT

## 2024-07-03 PROCEDURE — 77063 BREAST TOMOSYNTHESIS BI: CPT

## 2024-07-03 PROCEDURE — 77063 BREAST TOMOSYNTHESIS BI: CPT | Mod: 26,52

## 2024-07-03 PROCEDURE — 77067 SCR MAMMO BI INCL CAD: CPT | Mod: 26,52

## 2024-07-03 PROCEDURE — 77067 SCR MAMMO BI INCL CAD: CPT

## 2024-07-08 ENCOUNTER — APPOINTMENT (OUTPATIENT)
Dept: PHYSICAL MEDICINE AND REHAB | Facility: CLINIC | Age: 59
End: 2024-07-08
Payer: MEDICAID

## 2024-07-08 VITALS
OXYGEN SATURATION: 100 % | DIASTOLIC BLOOD PRESSURE: 74 MMHG | HEART RATE: 71 BPM | SYSTOLIC BLOOD PRESSURE: 112 MMHG | TEMPERATURE: 97.6 F | RESPIRATION RATE: 14 BRPM

## 2024-07-08 DIAGNOSIS — I89.0 LYMPHEDEMA, NOT ELSEWHERE CLASSIFIED: ICD-10-CM

## 2024-07-08 DIAGNOSIS — N64.4 MASTODYNIA: ICD-10-CM

## 2024-07-08 DIAGNOSIS — G89.18 OTHER ACUTE POSTPROCEDURAL PAIN: ICD-10-CM

## 2024-07-08 PROCEDURE — 99214 OFFICE O/P EST MOD 30 MIN: CPT

## 2024-07-22 ENCOUNTER — NON-APPOINTMENT (OUTPATIENT)
Age: 59
End: 2024-07-22

## 2024-09-04 ENCOUNTER — OUTPATIENT (OUTPATIENT)
Dept: OUTPATIENT SERVICES | Facility: HOSPITAL | Age: 59
LOS: 1 days | End: 2024-09-04
Payer: MEDICAID

## 2024-09-04 ENCOUNTER — APPOINTMENT (OUTPATIENT)
Dept: MRI IMAGING | Facility: IMAGING CENTER | Age: 59
End: 2024-09-04
Payer: MEDICAID

## 2024-09-04 DIAGNOSIS — Z98.890 OTHER SPECIFIED POSTPROCEDURAL STATES: Chronic | ICD-10-CM

## 2024-09-04 DIAGNOSIS — Z90.710 ACQUIRED ABSENCE OF BOTH CERVIX AND UTERUS: Chronic | ICD-10-CM

## 2024-09-04 DIAGNOSIS — Z90.49 ACQUIRED ABSENCE OF OTHER SPECIFIED PARTS OF DIGESTIVE TRACT: Chronic | ICD-10-CM

## 2024-09-04 DIAGNOSIS — D05.11 INTRADUCTAL CARCINOMA IN SITU OF RIGHT BREAST: ICD-10-CM

## 2024-09-04 PROCEDURE — A9585: CPT

## 2024-09-04 PROCEDURE — 77049 MRI BREAST C-+ W/CAD BI: CPT | Mod: 26

## 2024-09-04 PROCEDURE — C8937: CPT

## 2024-09-04 PROCEDURE — C8908: CPT

## 2024-10-21 ENCOUNTER — APPOINTMENT (OUTPATIENT)
Dept: PHYSICAL MEDICINE AND REHAB | Facility: CLINIC | Age: 59
End: 2024-10-21
Payer: MEDICAID

## 2024-10-21 DIAGNOSIS — L90.5 OTHER POSTPROCEDURAL COMPLICATIONS OF SKIN AND SUBCUTANEOUS TISSUE: ICD-10-CM

## 2024-10-21 DIAGNOSIS — M25.511 PAIN IN RIGHT SHOULDER: ICD-10-CM

## 2024-10-21 DIAGNOSIS — M25.512 PAIN IN RIGHT SHOULDER: ICD-10-CM

## 2024-10-21 DIAGNOSIS — L76.82 OTHER POSTPROCEDURAL COMPLICATIONS OF SKIN AND SUBCUTANEOUS TISSUE: ICD-10-CM

## 2024-10-21 PROCEDURE — 99214 OFFICE O/P EST MOD 30 MIN: CPT

## 2024-11-01 RX ORDER — LORAZEPAM 1 MG/1
1 TABLET ORAL
Qty: 2 | Refills: 0 | Status: ACTIVE | COMMUNITY
Start: 2024-11-01 | End: 1900-01-01

## 2024-11-20 ENCOUNTER — APPOINTMENT (OUTPATIENT)
Dept: MRI IMAGING | Facility: IMAGING CENTER | Age: 59
End: 2024-11-20
Payer: MEDICAID

## 2024-11-20 ENCOUNTER — OUTPATIENT (OUTPATIENT)
Dept: OUTPATIENT SERVICES | Facility: HOSPITAL | Age: 59
LOS: 1 days | End: 2024-11-20
Payer: MEDICAID

## 2024-11-20 DIAGNOSIS — Z90.49 ACQUIRED ABSENCE OF OTHER SPECIFIED PARTS OF DIGESTIVE TRACT: Chronic | ICD-10-CM

## 2024-11-20 DIAGNOSIS — D05.11 INTRADUCTAL CARCINOMA IN SITU OF RIGHT BREAST: ICD-10-CM

## 2024-11-20 DIAGNOSIS — Z92.3 PERSONAL HISTORY OF IRRADIATION: ICD-10-CM

## 2024-11-20 DIAGNOSIS — Z98.890 OTHER SPECIFIED POSTPROCEDURAL STATES: Chronic | ICD-10-CM

## 2024-11-20 DIAGNOSIS — Z90.710 ACQUIRED ABSENCE OF BOTH CERVIX AND UTERUS: Chronic | ICD-10-CM

## 2024-11-20 PROCEDURE — C8908: CPT

## 2024-11-20 PROCEDURE — 77049 MRI BREAST C-+ W/CAD BI: CPT | Mod: 26

## 2024-11-20 PROCEDURE — C8937: CPT

## 2024-11-20 PROCEDURE — A9585: CPT

## 2024-11-26 ENCOUNTER — NON-APPOINTMENT (OUTPATIENT)
Age: 59
End: 2024-11-26

## 2024-12-04 ENCOUNTER — NON-APPOINTMENT (OUTPATIENT)
Age: 59
End: 2024-12-04

## 2024-12-04 ENCOUNTER — APPOINTMENT (OUTPATIENT)
Dept: RADIATION ONCOLOGY | Facility: CLINIC | Age: 59
End: 2024-12-04
Payer: MEDICAID

## 2024-12-04 ENCOUNTER — APPOINTMENT (OUTPATIENT)
Dept: SURGICAL ONCOLOGY | Facility: CLINIC | Age: 59
End: 2024-12-04
Payer: MEDICAID

## 2024-12-04 VITALS
OXYGEN SATURATION: 99 % | WEIGHT: 170 LBS | HEART RATE: 72 BPM | BODY MASS INDEX: 29.02 KG/M2 | SYSTOLIC BLOOD PRESSURE: 129 MMHG | HEIGHT: 64 IN | DIASTOLIC BLOOD PRESSURE: 75 MMHG

## 2024-12-04 VITALS
SYSTOLIC BLOOD PRESSURE: 129 MMHG | HEART RATE: 72 BPM | HEIGHT: 64 IN | DIASTOLIC BLOOD PRESSURE: 75 MMHG | RESPIRATION RATE: 16 BRPM | OXYGEN SATURATION: 99 % | WEIGHT: 170 LBS | BODY MASS INDEX: 29.02 KG/M2

## 2024-12-04 DIAGNOSIS — N64.52 NIPPLE DISCHARGE: ICD-10-CM

## 2024-12-04 DIAGNOSIS — Z92.3 PERSONAL HISTORY OF IRRADIATION: ICD-10-CM

## 2024-12-04 DIAGNOSIS — N63.10 UNSPECIFIED LUMP IN THE RIGHT BREAST, UNSPECIFIED QUADRANT: ICD-10-CM

## 2024-12-04 DIAGNOSIS — D05.11 INTRADUCTAL CARCINOMA IN SITU OF RIGHT BREAST: ICD-10-CM

## 2024-12-04 PROCEDURE — G2211 COMPLEX E/M VISIT ADD ON: CPT | Mod: NC

## 2024-12-04 PROCEDURE — 99213 OFFICE O/P EST LOW 20 MIN: CPT

## 2024-12-04 PROCEDURE — 99215 OFFICE O/P EST HI 40 MIN: CPT

## 2024-12-05 LAB
PROLACTIN SERPL-MCNC: 13 NG/ML
TSH SERPL-ACNC: 0.85 UIU/ML

## 2024-12-17 ENCOUNTER — APPOINTMENT (OUTPATIENT)
Dept: ULTRASOUND IMAGING | Facility: IMAGING CENTER | Age: 59
End: 2024-12-17
Payer: MEDICAID

## 2024-12-17 ENCOUNTER — RESULT REVIEW (OUTPATIENT)
Age: 59
End: 2024-12-17

## 2024-12-17 ENCOUNTER — OUTPATIENT (OUTPATIENT)
Dept: OUTPATIENT SERVICES | Facility: HOSPITAL | Age: 59
LOS: 1 days | End: 2024-12-17
Payer: MEDICAID

## 2024-12-17 DIAGNOSIS — Z98.890 OTHER SPECIFIED POSTPROCEDURAL STATES: Chronic | ICD-10-CM

## 2024-12-17 DIAGNOSIS — N64.52 NIPPLE DISCHARGE: ICD-10-CM

## 2024-12-17 DIAGNOSIS — N63.10 UNSPECIFIED LUMP IN THE RIGHT BREAST, UNSPECIFIED QUADRANT: ICD-10-CM

## 2024-12-17 DIAGNOSIS — Z90.49 ACQUIRED ABSENCE OF OTHER SPECIFIED PARTS OF DIGESTIVE TRACT: Chronic | ICD-10-CM

## 2024-12-17 DIAGNOSIS — Z90.710 ACQUIRED ABSENCE OF BOTH CERVIX AND UTERUS: Chronic | ICD-10-CM

## 2024-12-17 PROCEDURE — 19083 BX BREAST 1ST LESION US IMAG: CPT | Mod: RT

## 2024-12-17 PROCEDURE — 77065 DX MAMMO INCL CAD UNI: CPT | Mod: 26,RT

## 2024-12-17 PROCEDURE — 19084 BX BREAST ADD LESION US IMAG: CPT | Mod: RT

## 2024-12-17 PROCEDURE — 88305 TISSUE EXAM BY PATHOLOGIST: CPT | Mod: 26

## 2024-12-17 PROCEDURE — 19083 BX BREAST 1ST LESION US IMAG: CPT

## 2024-12-17 PROCEDURE — A4648: CPT

## 2024-12-17 PROCEDURE — 77065 DX MAMMO INCL CAD UNI: CPT

## 2024-12-17 PROCEDURE — 19084 BX BREAST ADD LESION US IMAG: CPT

## 2024-12-17 PROCEDURE — 88305 TISSUE EXAM BY PATHOLOGIST: CPT

## 2024-12-21 ENCOUNTER — OUTPATIENT (OUTPATIENT)
Dept: OUTPATIENT SERVICES | Facility: HOSPITAL | Age: 59
LOS: 1 days | End: 2024-12-21
Payer: MEDICAID

## 2024-12-21 ENCOUNTER — APPOINTMENT (OUTPATIENT)
Dept: CT IMAGING | Facility: IMAGING CENTER | Age: 59
End: 2024-12-21
Payer: MEDICAID

## 2024-12-21 DIAGNOSIS — Z98.890 OTHER SPECIFIED POSTPROCEDURAL STATES: Chronic | ICD-10-CM

## 2024-12-21 DIAGNOSIS — Z00.8 ENCOUNTER FOR OTHER GENERAL EXAMINATION: ICD-10-CM

## 2024-12-21 PROCEDURE — 74177 CT ABD & PELVIS W/CONTRAST: CPT

## 2024-12-21 PROCEDURE — 74177 CT ABD & PELVIS W/CONTRAST: CPT | Mod: 26

## 2024-12-24 ENCOUNTER — NON-APPOINTMENT (OUTPATIENT)
Age: 59
End: 2024-12-24

## 2025-03-05 ENCOUNTER — APPOINTMENT (OUTPATIENT)
Dept: RADIATION ONCOLOGY | Facility: CLINIC | Age: 60
End: 2025-03-05
Payer: MEDICAID

## 2025-03-05 VITALS
SYSTOLIC BLOOD PRESSURE: 146 MMHG | RESPIRATION RATE: 16 BRPM | DIASTOLIC BLOOD PRESSURE: 82 MMHG | OXYGEN SATURATION: 98 % | HEART RATE: 75 BPM

## 2025-03-05 DIAGNOSIS — Z92.3 PERSONAL HISTORY OF IRRADIATION: ICD-10-CM

## 2025-03-05 DIAGNOSIS — D05.11 INTRADUCTAL CARCINOMA IN SITU OF RIGHT BREAST: ICD-10-CM

## 2025-03-05 DIAGNOSIS — I89.0 LYMPHEDEMA, NOT ELSEWHERE CLASSIFIED: ICD-10-CM

## 2025-03-05 PROCEDURE — 99212 OFFICE O/P EST SF 10 MIN: CPT

## 2025-06-26 ENCOUNTER — OUTPATIENT (OUTPATIENT)
Dept: OUTPATIENT SERVICES | Facility: HOSPITAL | Age: 60
LOS: 1 days | End: 2025-06-26
Payer: MEDICAID

## 2025-06-26 ENCOUNTER — RESULT REVIEW (OUTPATIENT)
Age: 60
End: 2025-06-26

## 2025-06-26 ENCOUNTER — APPOINTMENT (OUTPATIENT)
Dept: ULTRASOUND IMAGING | Facility: IMAGING CENTER | Age: 60
End: 2025-06-26
Payer: MEDICAID

## 2025-06-26 ENCOUNTER — APPOINTMENT (OUTPATIENT)
Dept: MAMMOGRAPHY | Facility: IMAGING CENTER | Age: 60
End: 2025-06-26
Payer: MEDICAID

## 2025-06-26 DIAGNOSIS — Z90.710 ACQUIRED ABSENCE OF BOTH CERVIX AND UTERUS: Chronic | ICD-10-CM

## 2025-06-26 DIAGNOSIS — D05.11 INTRADUCTAL CARCINOMA IN SITU OF RIGHT BREAST: ICD-10-CM

## 2025-06-26 DIAGNOSIS — Z90.49 ACQUIRED ABSENCE OF OTHER SPECIFIED PARTS OF DIGESTIVE TRACT: Chronic | ICD-10-CM

## 2025-06-26 DIAGNOSIS — Z98.890 OTHER SPECIFIED POSTPROCEDURAL STATES: Chronic | ICD-10-CM

## 2025-06-26 PROCEDURE — 76641 ULTRASOUND BREAST COMPLETE: CPT

## 2025-06-26 PROCEDURE — 77062 BREAST TOMOSYNTHESIS BI: CPT | Mod: 26

## 2025-06-26 PROCEDURE — 77066 DX MAMMO INCL CAD BI: CPT

## 2025-06-26 PROCEDURE — 77066 DX MAMMO INCL CAD BI: CPT | Mod: 26

## 2025-06-26 PROCEDURE — 76641 ULTRASOUND BREAST COMPLETE: CPT | Mod: 26,50

## 2025-06-26 PROCEDURE — G0279: CPT

## 2025-07-10 ENCOUNTER — NON-APPOINTMENT (OUTPATIENT)
Age: 60
End: 2025-07-10

## 2025-07-15 ENCOUNTER — NON-APPOINTMENT (OUTPATIENT)
Age: 60
End: 2025-07-15

## 2025-07-16 ENCOUNTER — APPOINTMENT (OUTPATIENT)
Dept: SURGICAL ONCOLOGY | Facility: CLINIC | Age: 60
End: 2025-07-16
Payer: MEDICAID

## 2025-07-16 ENCOUNTER — NON-APPOINTMENT (OUTPATIENT)
Age: 60
End: 2025-07-16

## 2025-07-16 VITALS
BODY MASS INDEX: 28.17 KG/M2 | WEIGHT: 165 LBS | HEIGHT: 64 IN | HEART RATE: 70 BPM | DIASTOLIC BLOOD PRESSURE: 83 MMHG | SYSTOLIC BLOOD PRESSURE: 134 MMHG | OXYGEN SATURATION: 99 % | RESPIRATION RATE: 17 BRPM

## 2025-07-16 PROCEDURE — G2211 COMPLEX E/M VISIT ADD ON: CPT | Mod: NC

## 2025-07-16 PROCEDURE — 99214 OFFICE O/P EST MOD 30 MIN: CPT

## 2025-07-21 ENCOUNTER — APPOINTMENT (OUTPATIENT)
Dept: PHYSICAL MEDICINE AND REHAB | Facility: CLINIC | Age: 60
End: 2025-07-21

## (undated) DEVICE — WARMING BLANKET LOWER ADULT

## (undated) DEVICE — DRAIN JACKSON PRATT 10MM FLAT 3/4 NO TROCAR

## (undated) DEVICE — DRSG TEGADERM 4X4.75"

## (undated) DEVICE — GOWN LG

## (undated) DEVICE — SUT CHROMIC 2-0 36" CT-1

## (undated) DEVICE — DRSG STERISTRIPS 0.5 X 4"

## (undated) DEVICE — GLV 6.5 PROTEXIS (WHITE)

## (undated) DEVICE — NDL HYPO SAFE 25G X 1" (ORANGE)

## (undated) DEVICE — DRAPE LAPAROTOMY TRANSVERSE

## (undated) DEVICE — VENODYNE/SCD SLEEVE CALF MEDIUM

## (undated) DEVICE — POSITIONER FOAM EGG CRATE ULNAR 2PCS (PINK)

## (undated) DEVICE — GAMMA SLEEVE DISPOSABLE

## (undated) DEVICE — SUT MONOCRYL 3-0 27" PS-2 UNDYED

## (undated) DEVICE — BASIN SET DOUBLE

## (undated) DEVICE — CAM-ESU 1501230: Type: DURABLE MEDICAL EQUIPMENT

## (undated) DEVICE — ELCTR ROCKER SWITCH PENCIL BLUE 10FT

## (undated) DEVICE — SAVI SCOUT HANDPIECE

## (undated) DEVICE — POSITIONER PATIENT SAFETY STRAP 3X60"

## (undated) DEVICE — PREP BETADINE KIT

## (undated) DEVICE — DRAPE 3/4 SHEET 52X76"

## (undated) DEVICE — PACK MINOR NO DRAPE

## (undated) DEVICE — GLV 7 PROTEXIS (WHITE)

## (undated) DEVICE — SOL IRR POUR H2O 500ML

## (undated) DEVICE — VENODYNE/SCD SLEEVE CALF LARGE

## (undated) DEVICE — SUCTION YANKAUER OPEN TIP NO VENT CURVE

## (undated) DEVICE — ELCTR GROUNDING PAD ADULT COVIDIEN

## (undated) DEVICE — DRAPE TOWEL BLUE 17" X 24"

## (undated) DEVICE — SOL IRR POUR NS 0.9% 500ML

## (undated) DEVICE — DRAPE COVER SLEEVE GAMMA FINDER III

## (undated) DEVICE — LIGASURE SMALL JAW

## (undated) DEVICE — SYR LUER LOK 20CC

## (undated) DEVICE — LABELS BLANK W PEN

## (undated) DEVICE — TUBING SUCTION NONCONDUCTIVE 6MM X 12FT

## (undated) DEVICE — RADIOGRAPHY DVC SPEC TRANSPEC

## (undated) DEVICE — ELCTR BOVIE TIP BLADE INSULATED 4" EDGE

## (undated) DEVICE — PACK MINOR CHEST BREAST

## (undated) DEVICE — DRAPE CHEST BREAST 106" X 122"

## (undated) DEVICE — DRSG ALLEVYN LIFE 5X5"